# Patient Record
(demographics unavailable — no encounter records)

---

## 2024-10-08 NOTE — HISTORY OF PRESENT ILLNESS
[Disease: _____________________] : Disease: [unfilled] [de-identified] : This is a 51 yo male with hx of pancreatic head mass which on biopsy is consistent with adenocarcinoma who presents for evaluation and management. 06/02/2024, CT-Scan of Abdomen/Pelvis w IV Contrast: 2 cm circumscribed hypodense mass within the head of the pancreas suspicious for pancreatic carcinoma resulting in moderate dilatation of the main pancreatic duct, biliary system as well as the gallbladder. Mild peripancreatic lymphadenopathy possibly metastatic in nature. Finding suspicious for mild segmental colitis involving descending and  sigmoid colon.   06/02/2024, CT-Scan of Chest: A 10 X 8 X 6 mm mildly spiculated nodule in the right lower lobe which is suspicious. Further evaluation with PET/CT is recommended. Alternatively, short-term follow-up with chest CT in 3 months may be considered. No other metastatic lesion in the chest. Partially visualized pancreatic head mass with dilatation of the main pancreatic duct.   06/04/2024, ERCP: The major papilla was native. The major papilla had a normal appearance. The common bile duct was deeply cannulated using a traction sphincterotome with guidewire. No bleeding was observed. Localized, extrinsic and severe stricture was visualized in the distal common bile duct. The stricture was traversable by wire. Performed brushing with cytology brush in the distal common bile duct. One 10 mm x 4mm fully covered metal stent was placed in the distal common bile duct. The pathology report from that was as follows: Distal common bile duct brushing for cytology: Positive for Adenocarcinoma.   06/10/2024, MRI of Abdomen: In the head of pancreas, there is a 2.5 X 2.1 cm heterogenous mass suggestive of malignancy. Pancreatic duct is dilated up to 10 mm. Status post CBD stent placement. There is significant improvemnt in the intrahepatic biliary dilatation. Right lower lobe  lung nodules is incompletely imaged.  [de-identified] : Since the last visit the patient continues on FOLFIRINOX and tolerates the treatment well.  He does complain of some low-grade midsternal pain but otherwise his other bone pain has been improved.

## 2024-10-08 NOTE — ASSESSMENT
[Curative] : Goals of care discussed with patient: Curative [FreeTextEntry1] : The patient will continue on protocol treatment for his metastatic gastric carcinoma with bone metastases.  He will continue to be monitored for side effects and toxicity.  He will undergo repeat blood testing and scans to assess for response or progression of disease.  He will continue nutrition support.  The patient will also be a candidate for bone directed treatment pending dental evaluation. [Palliative] : Goals of care discussed with patient: Palliative [Palliative Care Plan] : not applicable at this time

## 2024-10-08 NOTE — HISTORY OF PRESENT ILLNESS
[Disease: _____________________] : Disease: [unfilled] [de-identified] : This is a 51 yo male with hx of pancreatic head mass which on biopsy is consistent with adenocarcinoma who presents for evaluation and management. 06/02/2024, CT-Scan of Abdomen/Pelvis w IV Contrast: 2 cm circumscribed hypodense mass within the head of the pancreas suspicious for pancreatic carcinoma resulting in moderate dilatation of the main pancreatic duct, biliary system as well as the gallbladder. Mild peripancreatic lymphadenopathy possibly metastatic in nature. Finding suspicious for mild segmental colitis involving descending and  sigmoid colon.   06/02/2024, CT-Scan of Chest: A 10 X 8 X 6 mm mildly spiculated nodule in the right lower lobe which is suspicious. Further evaluation with PET/CT is recommended. Alternatively, short-term follow-up with chest CT in 3 months may be considered. No other metastatic lesion in the chest. Partially visualized pancreatic head mass with dilatation of the main pancreatic duct.   06/04/2024, ERCP: The major papilla was native. The major papilla had a normal appearance. The common bile duct was deeply cannulated using a traction sphincterotome with guidewire. No bleeding was observed. Localized, extrinsic and severe stricture was visualized in the distal common bile duct. The stricture was traversable by wire. Performed brushing with cytology brush in the distal common bile duct. One 10 mm x 4mm fully covered metal stent was placed in the distal common bile duct. The pathology report from that was as follows: Distal common bile duct brushing for cytology: Positive for Adenocarcinoma.   06/10/2024, MRI of Abdomen: In the head of pancreas, there is a 2.5 X 2.1 cm heterogenous mass suggestive of malignancy. Pancreatic duct is dilated up to 10 mm. Status post CBD stent placement. There is significant improvemnt in the intrahepatic biliary dilatation. Right lower lobe  lung nodules is incompletely imaged.  [de-identified] : Since the last visit the patient continues on FOLFIRINOX and tolerates the treatment well.  He does complain of some low-grade midsternal pain but otherwise his other bone pain has been improved.

## 2024-10-08 NOTE — HISTORY OF PRESENT ILLNESS
[Disease: _____________________] : Disease: [unfilled] [de-identified] : This is a 51 yo male with hx of pancreatic head mass which on biopsy is consistent with adenocarcinoma who presents for evaluation and management. 06/02/2024, CT-Scan of Abdomen/Pelvis w IV Contrast: 2 cm circumscribed hypodense mass within the head of the pancreas suspicious for pancreatic carcinoma resulting in moderate dilatation of the main pancreatic duct, biliary system as well as the gallbladder. Mild peripancreatic lymphadenopathy possibly metastatic in nature. Finding suspicious for mild segmental colitis involving descending and  sigmoid colon.   06/02/2024, CT-Scan of Chest: A 10 X 8 X 6 mm mildly spiculated nodule in the right lower lobe which is suspicious. Further evaluation with PET/CT is recommended. Alternatively, short-term follow-up with chest CT in 3 months may be considered. No other metastatic lesion in the chest. Partially visualized pancreatic head mass with dilatation of the main pancreatic duct.   06/04/2024, ERCP: The major papilla was native. The major papilla had a normal appearance. The common bile duct was deeply cannulated using a traction sphincterotome with guidewire. No bleeding was observed. Localized, extrinsic and severe stricture was visualized in the distal common bile duct. The stricture was traversable by wire. Performed brushing with cytology brush in the distal common bile duct. One 10 mm x 4mm fully covered metal stent was placed in the distal common bile duct. The pathology report from that was as follows: Distal common bile duct brushing for cytology: Positive for Adenocarcinoma.   06/10/2024, MRI of Abdomen: In the head of pancreas, there is a 2.5 X 2.1 cm heterogenous mass suggestive of malignancy. Pancreatic duct is dilated up to 10 mm. Status post CBD stent placement. There is significant improvemnt in the intrahepatic biliary dilatation. Right lower lobe  lung nodules is incompletely imaged.  [de-identified] : Since the last visit the patient continues on FOLFIRINOX and tolerates the treatment well.  He does complain of some low-grade midsternal pain but otherwise his other bone pain has been improved.

## 2024-10-22 NOTE — ASSESSMENT
[FreeTextEntry1] : S/p C7 FOLFIRNOX. He is stable to continue cycle 8 today.  He will continue to be monitored for side effects and toxicity.  He will undergo repeat blood testing and scans to assess for response or progression of disease. After cycle 8, we will get a repeat CT-Scan of abdomen/pelvis/chest to assess for progression vs regression of disease. His case will be re-evaluated by [facility] to see, if her would be ready for surgical resection. Dr. Mar will contact patient with next steps post review.

## 2024-10-22 NOTE — HISTORY OF PRESENT ILLNESS
[de-identified] : This is a 51 yo male with hx of pancreatic head mass which on biopsy is consistent with adenocarcinoma who presents for evaluation and management. 06/02/2024, CT-Scan of Abdomen/Pelvis w IV Contrast: 2 cm circumscribed hypodense mass within the head of the pancreas suspicious for pancreatic carcinoma resulting in moderate dilatation of the main pancreatic duct, biliary system as well as the gallbladder. Mild peripancreatic lymphadenopathy possibly metastatic in nature. Finding suspicious for mild segmental colitis involving descending and  sigmoid colon.   06/02/2024, CT-Scan of Chest: A 10 X 8 X 6 mm mildly spiculated nodule in the right lower lobe which is suspicious. Further evaluation with PET/CT is recommended. Alternatively, short-term follow-up with chest CT in 3 months may be considered. No other metastatic lesion in the chest. Partially visualized pancreatic head mass with dilatation of the main pancreatic duct.   06/04/2024, ERCP: The major papilla was native. The major papilla had a normal appearance. The common bile duct was deeply cannulated using a traction sphincterotome with guidewire. No bleeding was observed. Localized, extrinsic and severe stricture was visualized in the distal common bile duct. The stricture was traversable by wire. Performed brushing with cytology brush in the distal common bile duct. One 10 mm x 4mm fully covered metal stent was placed in the distal common bile duct. The pathology report from that was as follows: Distal common bile duct brushing for cytology: Positive for Adenocarcinoma.   06/10/2024, MRI of Abdomen: In the head of pancreas, there is a 2.5 X 2.1 cm heterogenous mass suggestive of malignancy. Pancreatic duct is dilated up to 10 mm. Status post CBD stent placement. There is significant improvemnt in the intrahepatic biliary dilatation. Right lower lobe  lung nodules is incompletely imaged.  [de-identified] : S/p C7 FOLFIRINOX. Patient feels fine. He denies any abdominal pain, peripheral neuropathy, mouth sores or constipation. He uses Creon to control the loose stool with + improvement. The patient has gone back to work without difficulty.

## 2024-10-30 NOTE — HISTORY OF PRESENT ILLNESS
[Jaundice] : jaundice [Acholic stools] : acholic stools [Pruritus] : pruritus [ERCP] : ERCP [Endostent] : endostent [EUS] : EUS [Biopsy] : biopsy performed [Not staged outside] : not staged outside [Pancreatic ductal adenocarcinoma] : Pancreatic ductal adenocarcinoma [Borderline] : borderline [Less than 180 (abutment)] : less than 180 (abutment) [head] : head [Nutrition] : Nutrition [Social Work] : Social Work [Fully active, able to carry on all pre-disease performance without restriction] : Status 0 - Fully active, able to carry on all pre-disease performance without restriction [Tolerated well] : tolerated well [Chemotherapy] : chemotherapy [Before Surgery] : before surgery [History of Neoadjuvant Therapy] : History of Neoadjuvant Therapy: Yes [5-FU based] : Type of 1st Neoadjuvant chemotherapy: 5-FU based [de-identified] : This is a non-billable note*   53 year old male    Labs:   7/16/24   Ca 19-9: 308   CEA: 1.2  Tbili: 0.5  8/27/24:  Ca 19-9 : 334  CEA: 4.9  Tbili: 0.2  10/22/24: Ca 19-9:  175  CEA: 6.2  Tbili:  0.3  [TextBox_4] : 06/02/2024 [TextBox_41] : adenocarcinoma [TextBox_43] : 07/15/2024 [TextBox_56] : 25 [FreeTextEntry6] : Continue current chemo.  Review in 2 months [Stable disease] : stable disease [Surgical resection] : surgical resection [Curative (aimed at resection)] : curative (aimed at resection) [TextBox_5] : 10/29/2024 [TextBox_38] : 175 [FreeTextEntry3] : 6.2 [FreeTextEntry4] : 0.3 [TextBox_40] : 10/25/2024 [TextBox_74] : Tumor slightly smaller.  Vessels: SMV < 180.  Replaced R HA from the SMA with >180 contact w/ primary tumor Contained abscess in the liver      [TextBox_12] : FOLFIRINOX [TextBox_16] : 07/18/2024 [TextBox_18] : 10/22/2024 [TextBox_20] : 8

## 2024-10-30 NOTE — HISTORY OF PRESENT ILLNESS
[Jaundice] : jaundice [Acholic stools] : acholic stools [Pruritus] : pruritus [ERCP] : ERCP [Endostent] : endostent [EUS] : EUS [Biopsy] : biopsy performed [Not staged outside] : not staged outside [Pancreatic ductal adenocarcinoma] : Pancreatic ductal adenocarcinoma [Borderline] : borderline [Less than 180 (abutment)] : less than 180 (abutment) [head] : head [Nutrition] : Nutrition [Social Work] : Social Work [Fully active, able to carry on all pre-disease performance without restriction] : Status 0 - Fully active, able to carry on all pre-disease performance without restriction [Tolerated well] : tolerated well [Chemotherapy] : chemotherapy [Before Surgery] : before surgery [History of Neoadjuvant Therapy] : History of Neoadjuvant Therapy: Yes [5-FU based] : Type of 1st Neoadjuvant chemotherapy: 5-FU based [de-identified] : This is a non-billable note*   53 year old male    Labs:   7/16/24   Ca 19-9: 308   CEA: 1.2  Tbili: 0.5  8/27/24:  Ca 19-9 : 334  CEA: 4.9  Tbili: 0.2  10/22/24: Ca 19-9:  175  CEA: 6.2  Tbili:  0.3  [TextBox_4] : 06/02/2024 [TextBox_41] : adenocarcinoma [TextBox_43] : 07/15/2024 [TextBox_56] : 25 [FreeTextEntry6] : Continue current chemo.  Review in 2 months [Stable disease] : stable disease [Surgical resection] : surgical resection [Curative (aimed at resection)] : curative (aimed at resection) [TextBox_5] : 10/29/2024 [TextBox_38] : 175 [FreeTextEntry3] : 6.2 [FreeTextEntry4] : 0.3 [TextBox_40] : 10/25/2024 [TextBox_74] : Tumor slightly smaller.  Vessels: SMV < 180.  Replaced R HA from the SMA with >180 contact w/ primary tumor Contained abscess in the liver      [TextBox_12] : FOLFIRINOX [TextBox_16] : 07/18/2024 [TextBox_18] : 10/22/2024 [TextBox_20] : 8

## 2024-11-05 NOTE — HISTORY OF PRESENT ILLNESS
[Disease: _____________________] : Disease: [unfilled] [de-identified] : This is a 53 yo male with hx of pancreatic head mass which on biopsy is consistent with adenocarcinoma who presents for evaluation and management. 06/02/2024, CT-Scan of Abdomen/Pelvis w IV Contrast: 2 cm circumscribed hypodense mass within the head of the pancreas suspicious for pancreatic carcinoma resulting in moderate dilatation of the main pancreatic duct, biliary system as well as the gallbladder. Mild peripancreatic lymphadenopathy possibly metastatic in nature. Finding suspicious for mild segmental colitis involving descending and  sigmoid colon.   06/02/2024, CT-Scan of Chest: A 10 X 8 X 6 mm mildly spiculated nodule in the right lower lobe which is suspicious. Further evaluation with PET/CT is recommended. Alternatively, short-term follow-up with chest CT in 3 months may be considered. No other metastatic lesion in the chest. Partially visualized pancreatic head mass with dilatation of the main pancreatic duct.   06/04/2024, ERCP: The major papilla was native. The major papilla had a normal appearance. The common bile duct was deeply cannulated using a traction sphincterotome with guidewire. No bleeding was observed. Localized, extrinsic and severe stricture was visualized in the distal common bile duct. The stricture was traversable by wire. Performed brushing with cytology brush in the distal common bile duct. One 10 mm x 4mm fully covered metal stent was placed in the distal common bile duct. The pathology report from that was as follows: Distal common bile duct brushing for cytology: Positive for Adenocarcinoma.   06/10/2024, MRI of Abdomen: In the head of pancreas, there is a 2.5 X 2.1 cm heterogenous mass suggestive of malignancy. Pancreatic duct is dilated up to 10 mm. Status post CBD stent placement. There is significant improvemnt in the intrahepatic biliary dilatation. Right lower lobe  lung nodules is incompletely imaged.  [de-identified] : S/p C8 FOLFIRINOX. The patient was seen in the treatment room. Patient feels fine. He denies any abdominal pain, peripheral neuropathy, mouth sores or constipation. He has been scheduled for surgery in early December 2024.

## 2024-11-06 NOTE — ASSESSMENT
[FreeTextEntry1] : Mr. ROSALINO TREJO is a 52-year-old male [facility] patient presenting for follow-up to discuss resection for PDAC diagnosed 6/2024 initially staged as borderline s/p neoadjuvant FFX.   S/p 4 months (8 cycles) of neoadjuvant FFX 7/18-10/22/2024 with med/onc Dr. Mar.  Restaging CAP CT 10/25/2024 demonstrated a hypodense mass in the pancreatic head/uncinate process has decreased in size measuring 2.6 x 2 cm (3/85), previously 3.2 x 2.4 cm when measured in similar fashion. There is less than 180 degrees involvement of the main portal vein. The SMA and celiac axis are not involved. The remainder of the pancreas is atrophic with a markedly dilated pancreatic duct measuring up to 1.2 cm. There was a new fluid density 4.7 x 3.3 cm cystic lesion in the right lobe of the liver apparently arising from the gallbladder dome which also demonstrates wall thickening. This is suspicious for focal gallbladder necrosis and abscess formation.  His case was re-reviewed at [facility] 10/29/24. Consensus was the tumor was slightly smaller. Vessels: SMV < 180. Replaced R HA from the SMA with >180 contact w/ primary tumor. Recommendation was for surgical resection.   6/12/24 - CEA: 2.1, Ca 19-9: 445 at diagnosis, now 10/22/24 - CEA: 6.2, Ca 19-9: 175.  Patient presents for follow-up and to discuss resection. There is no evidence of metastases on his imaging studies which is good news. Discussed the vascular involvement of his tumor and replaced right hepatic artery. I drew a diagram to better assist patient in understanding the anatomy and pathology of his condition. Artery reconstruction will be determined intraoperatively, though there is low chance of this.   Due to the location and size he will require a pancreaticoduodenectomy (Whipple) procedure for removal. I drew a diagram for the patient and family to better understand the operation. The procedure and associated risks, benefits and possible complications were discussed and all questions were answered. The procedure involves removal of the gallbladder, head of the pancreas, bile duct, duodenum and sometimes a small portion of the stomach and will last 3-4 hours with a hospital stay of about one week. We discussed that the most common complaint after this procedure is fatigue followed by poor appetite with associated weight loss. It will take approximately 8-12 weeks to feel back to normal after the operation. The risks, benefits, and details of the operation were discussed. These include bleeding, infection, damage to surrounding structures, chyle leakage, delayed gastric emptying, cardiopulmonary complications, and the risks of anesthesia. Mortality is quoted at <1.4%. I discussed that adjuvant treatment including additional chemotherapy/ radiation therapy will be determined by final pathology. The patient expressed understanding and the desire to proceed. Our office will contact the patient to schedule 1 month out from his last dose of chemo 10/22. He will require standard PST and PCP clearance.  Today, I personally spent 45 minutes in total time including reviewing imaging and studies, discussing complex treatment regimens, direct face to face time with the patient, patient education and counseling.

## 2024-11-06 NOTE — HISTORY OF PRESENT ILLNESS
[de-identified] : Mr. ROSALINO TREJO is a 52-year-old male [facility] patient presenting for follow-up to discuss resection for PDAC initially staged as borderline s/p neoadjuvant FFX. He has a prior history of ETOH abuse, sober for >15 years; FMHx of colon cancer (father and paternal uncle), prostate cancer (father), PNET (father)  He initially presented to [facility] on 6/2/2024 after noticing aliza colored (acholic stools) and pruritus. Found to have jaundice. Underwent CT A/P IC on 6/2/2024 showing a 2 cm circumscribed hypodense mass within the head of the pancreas suspicious for pancreatic carcinoma result in in moderate dilatation of the main pancreatic duct, biliary system as well as the gall bladder. Mild peripancreatic lymphadenopathy, possibly metastatic in nature. Also incidental findings suspicious for mild segmental colitis involving descending and sigmoid colon. CT chest 6/5/24: A 10 x 6 mm mildly spiculated nodule in the right lower lobe which is suspicious.  Underwent ERCP with stent placement on 06/03/2024 (no EUS). Pathology of bile duct brushings are consistent with adenocarcinoma.  Further evaluation with MRI on 06/10/2024 demonstrated a 2.5 x 2.1 cm heterogeneous mass in the head of the pancreas, suggestive of malignancy. Main portal vein was present. The mass abutted the superior mesenteric vein but did not totally encase it; superior mesenteric artery was patent. Pancreatic duct was dilated up to 10 mm. S/p CBD stent placement; there was significant improvement in intrahepatic biliary ductal dilatation. Gallbladder distended with tiny polyps noted.  S/p 4 months (8 cycles) of neoadjuvant FFX 7/18-10/22/2024 with med/onc Dr. Mar.  Restaging CAP CT 10/25/2024 demonstrated a hypodense mass in the pancreatic head/uncinate process has decreased in size measuring 2.6 x 2 cm (3/85), previously 3.2 x 2.4 cm when measured in similar fashion. There is less than 180 degrees involvement of the main portal vein. The SMA and celiac axis are not involved. The remainder of the pancreas is atrophic with a markedly dilated pancreatic duct measuring up to 1.2 cm. There was a new fluid density 4.7 x 3.3 cm cystic lesion in the right lobe of the liver apparently arising from the gallbladder dome which also demonstrates wall thickening. This is suspicious for focal gallbladder necrosis and abscess formation.  His case was re-reviewed at [facility] 10/29/24. Consensus was the tumor was slightly smaller. Vessels: SMV < 180. Replaced R HA from the SMA with >180 contact w/ primary tumor. Recommendation was for surgical resection.   Labs: 6/5/24 (post ERCP/stent): Ca 19-9: 433 CEA: --; AST: 135, ALT: 296, ALP: 304, Tbili: 6.5. 6/12/24 - CEA: 2.1, Ca 19-9: 445 7/16/24 - CEA: 1.2, Ca 19-9: 308 8/13/24 - CEA: 2.9, Ca 19-9: 283 8/27/24 - CEA: 4.9, Ca 19-9: 334 9/24/24 - CEA: 4.5, Ca 19-9: 234 10/8/24 - CEA: 4.9, Ca 19-9: 167 10/22/24 - CEA: 6.2, Ca 19-9: 175  Patient presents for follow-up and to discuss resection. Tolerating chemo well barring some difficulty in the past week. He has returned to work part-time.

## 2024-11-06 NOTE — HISTORY OF PRESENT ILLNESS
[de-identified] : Mr. ROSALINO TREJO is a 52-year-old male [facility] patient presenting for follow-up to discuss resection for PDAC initially staged as borderline s/p neoadjuvant FFX. He has a prior history of ETOH abuse, sober for >15 years; FMHx of colon cancer (father and paternal uncle), prostate cancer (father), PNET (father)  He initially presented to [facility] on 6/2/2024 after noticing aliza colored (acholic stools) and pruritus. Found to have jaundice. Underwent CT A/P IC on 6/2/2024 showing a 2 cm circumscribed hypodense mass within the head of the pancreas suspicious for pancreatic carcinoma result in in moderate dilatation of the main pancreatic duct, biliary system as well as the gall bladder. Mild peripancreatic lymphadenopathy, possibly metastatic in nature. Also incidental findings suspicious for mild segmental colitis involving descending and sigmoid colon. CT chest 6/5/24: A 10 x 6 mm mildly spiculated nodule in the right lower lobe which is suspicious.  Underwent ERCP with stent placement on 06/03/2024 (no EUS). Pathology of bile duct brushings are consistent with adenocarcinoma.  Further evaluation with MRI on 06/10/2024 demonstrated a 2.5 x 2.1 cm heterogeneous mass in the head of the pancreas, suggestive of malignancy. Main portal vein was present. The mass abutted the superior mesenteric vein but did not totally encase it; superior mesenteric artery was patent. Pancreatic duct was dilated up to 10 mm. S/p CBD stent placement; there was significant improvement in intrahepatic biliary ductal dilatation. Gallbladder distended with tiny polyps noted.  S/p 4 months (8 cycles) of neoadjuvant FFX 7/18-10/22/2024 with med/onc Dr. Mar.  Restaging CAP CT 10/25/2024 demonstrated a hypodense mass in the pancreatic head/uncinate process has decreased in size measuring 2.6 x 2 cm (3/85), previously 3.2 x 2.4 cm when measured in similar fashion. There is less than 180 degrees involvement of the main portal vein. The SMA and celiac axis are not involved. The remainder of the pancreas is atrophic with a markedly dilated pancreatic duct measuring up to 1.2 cm. There was a new fluid density 4.7 x 3.3 cm cystic lesion in the right lobe of the liver apparently arising from the gallbladder dome which also demonstrates wall thickening. This is suspicious for focal gallbladder necrosis and abscess formation.  His case was re-reviewed at [facility] 10/29/24. Consensus was the tumor was slightly smaller. Vessels: SMV < 180. Replaced R HA from the SMA with >180 contact w/ primary tumor. Recommendation was for surgical resection.   Labs: 6/5/24 (post ERCP/stent): Ca 19-9: 433 CEA: --; AST: 135, ALT: 296, ALP: 304, Tbili: 6.5. 6/12/24 - CEA: 2.1, Ca 19-9: 445 7/16/24 - CEA: 1.2, Ca 19-9: 308 8/13/24 - CEA: 2.9, Ca 19-9: 283 8/27/24 - CEA: 4.9, Ca 19-9: 334 9/24/24 - CEA: 4.5, Ca 19-9: 234 10/8/24 - CEA: 4.9, Ca 19-9: 167 10/22/24 - CEA: 6.2, Ca 19-9: 175  Patient presents for follow-up and to discuss resection. Tolerating chemo well barring some difficulty in the past week. He has returned to work part-time.

## 2024-11-06 NOTE — PHYSICAL EXAM
[FreeTextEntry1] : General: No acute distress. Well nourished and well kept. Head: AT/NC Eyes: PERRL. EOMI. Pulmonary: No respiratory distress. Abdomen: Soft. NT/ND. No rebound, no guarding, no rigidity. No peritoneal signs. No masses. Neurological: A&O x 4. Psychiatric: Normal affect and mood.

## 2024-11-06 NOTE — END OF VISIT
[FreeTextEntry3] : By signing my name below, I, Memeganyoan Burnett, attest that this documentation has been prepared under the direction and in the presence of Kiran Silverman MD in the following sections HISTORY OF PRESENT ILLNESS; PAST MEDICAL/FAMILY/SOCIAL HISTORY; REVIEW OF SYSTEMS; PHYSICAL EXAM; ASSESSMENT/PLAN.

## 2024-11-06 NOTE — ASSESSMENT
[FreeTextEntry1] : Mr. ROSALINO TREJO is a 52-year-old male [facility] patient presenting for follow-up to discuss resection for PDAC diagnosed 6/2024 initially staged as borderline s/p neoadjuvant FFX.   S/p 4 months (8 cycles) of neoadjuvant FFX 7/18-10/22/2024 with med/onc Dr. Mar.  Restaging CAP CT 10/25/2024 demonstrated a hypodense mass in the pancreatic head/uncinate process has decreased in size measuring 2.6 x 2 cm (3/85), previously 3.2 x 2.4 cm when measured in similar fashion. There is less than 180 degrees involvement of the main portal vein. The SMA and celiac axis are not involved. The remainder of the pancreas is atrophic with a markedly dilated pancreatic duct measuring up to 1.2 cm. There was a new fluid density 4.7 x 3.3 cm cystic lesion in the right lobe of the liver apparently arising from the gallbladder dome which also demonstrates wall thickening. This is suspicious for focal gallbladder necrosis and abscess formation.  His case was re-reviewed at [facility] 10/29/24. Consensus was the tumor was slightly smaller. Vessels: SMV < 180. Replaced R HA from the SMA with >180 contact w/ primary tumor. Recommendation was for surgical resection.   6/12/24 - CEA: 2.1, Ca 19-9: 445 at diagnosis, now 10/22/24 - CEA: 6.2, Ca 19-9: 175.  Patient presents for follow-up and to discuss resection. There is no evidence of metastases on his imaging studies which is good news. Discussed the vascular involvement of his tumor and replaced right hepatic artery. I drew a diagram to better assist patient in understanding the anatomy and pathology of his condition. Artery reconstruction will be determined intraoperatively, though there is low chance of this.   Due to the location and size he will require a pancreaticoduodenectomy (Whipple) procedure for removal. I drew a diagram for the patient and family to better understand the operation. The procedure and associated risks, benefits and possible complications were discussed and all questions were answered. The procedure involves removal of the gallbladder, head of the pancreas, bile duct, duodenum and sometimes a small portion of the stomach and will last 3-4 hours with a hospital stay of about one week. We discussed that the most common complaint after this procedure is fatigue followed by poor appetite with associated weight loss. It will take approximately 8-12 weeks to feel back to normal after the operation. The risks, benefits, and details of the operation were discussed. These include bleeding, infection, damage to surrounding structures, chyle leakage, delayed gastric emptying, cardiopulmonary complications, and the risks of anesthesia. Mortality is quoted at <1.4%. I discussed that adjuvant treatment including additional chemotherapy/ radiation therapy will be determined by final pathology. The patient expressed understanding and the desire to proceed. Our office will contact the patient to schedule 1 month out from his last dose of chemo 10/22. He will require standard PST and PCP clearance.  Today, I personally spent 45 minutes in total time including reviewing imaging and studies, discussing complex treatment regimens, direct face to face time with the patient, patient education and counseling.

## 2025-01-16 NOTE — ASSESSMENT
[FreeTextEntry1] : Mr. 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  S/p 4 months (8 cycles) of neoadjuvant FFX 7/18-10/22/2024 with med/onc Dr. Mar.  Post-op patient sustained 2 vasovagal episodes (also had "fainting spells" with chemo); cardiac and neuro workup negative. Decreased Lantus (follows with endo) and stopped Creon at discharge. Was having normal BMs.  Patient presents for postoperative evaluation. Needs to f/u with med/onc for different chemo regimen and rad/onc. Patient was educated on proper Creon use, instructed to take with meals. Advised him to stop taking stool softeners which will likely help with bowel movements. Advised him to eat small, frequent meals, and supplement nutrition with protein shakes. Incision is well healed on examination.  We discussed the final pathology of the operation which yielded 7/20 positive lymph nodes. The chemotherapy regimen he was on (FFX) did not seem to have a dramatic effect on the tumor based on pathology. He is recovering well enough that I would like to start thinking about next steps including switching chemo regimen and consider RT, likely adjuvant chemo to start with first. I discussed his case with his med/onc Dr. Mar to coordinate. Patient lives [facility] and states he is willing to stay in  with his father to get additional chemo treatment; he wants to stay with the same doctors. He was encouraged to contact the office with any acute changes to his health. Discussed taking Tylenol and ibuprofen and continue with heating packs for muscular back discomfort.  Discussed proper way to take Creon before meals. Discontinue stool softeners as he was having some intermittent loose stools. We will plan to see him back in 2-3 months. Will contact the office in the interim should his condition change.

## 2025-01-16 NOTE — HISTORY OF PRESENT ILLNESS
[FreeTextEntry1] : Mr. 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 s/p neoadjuvant FFX. He has a prior history of ETOH abuse, sober for >15 years; FMHx of colon cancer (father and paternal uncle), prostate cancer (father), PNET (father)  He initially presented to [facility] on 6/2/2024 after noticing aliza colored (acholic stools) and pruritus. Found to have jaundice. Underwent CT A/P IC on 6/2/2024 showing a 2 cm circumscribed hypodense mass within the head of the pancreas suspicious for pancreatic carcinoma result in in moderate dilatation of the main pancreatic duct, biliary system as well as the gall bladder. Mild peripancreatic lymphadenopathy, possibly metastatic in nature. Also incidental findings suspicious for mild segmental colitis involving descending and sigmoid colon. CT chest 6/5/24: A 10 x 6 mm mildly spiculated nodule in the right lower lobe which is suspicious.  Underwent ERCP with stent placement on 06/03/2024 (no EUS). Pathology of bile duct brushings are consistent with adenocarcinoma.  Further evaluation with MRI on 06/10/2024 demonstrated a 2.5 x 2.1 cm heterogeneous mass in the head of the pancreas, suggestive of malignancy. Main portal vein was present. The mass abutted the superior mesenteric vein but did not totally encase it; superior mesenteric artery was patent. Pancreatic duct was dilated up to 10 mm. S/p CBD stent placement; there was significant improvement in intrahepatic biliary ductal dilatation. Gallbladder distended with tiny polyps noted.  S/p 4 months (8 cycles) of neoadjuvant FFX 7/18-10/22/2024 with med/onc Dr. Mar.  Restaging CAP CT 10/25/2024 demonstrated a hypodense mass in the pancreatic head/uncinate process has decreased in size measuring 2.6 x 2 cm (3/85), previously 3.2 x 2.4 cm when measured in similar fashion. There is less than 180 degrees involvement of the main portal vein. The SMA and celiac axis are not involved. The remainder of the pancreas is atrophic with a markedly dilated pancreatic duct measuring up to 1.2 cm. There was a new fluid density 4.7 x 3.3 cm cystic lesion in the right lobe of the liver apparently arising from the gallbladder dome which also demonstrates wall thickening. This is suspicious for focal gallbladder necrosis and abscess formation.  His case was re-reviewed at [facility] 10/29/24. Consensus was the tumor was slightly smaller. Vessels: SMV < 180. Replaced R HA from the SMA with >180 contact w/ primary tumor. Recommendation was for surgical resection.   Patient s/p Whipple with right hemicolectomy on 12/04/2024. 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 Post-op patient sustained 2 vasovagal episodes (also had "fainting spells" with chemo); cardiac and neuro workup negative. Decreased Lantus (follows with endo) and stopped Creon at discharge. Was having normal BMs.   Labs: 6/5/24 (post ERCP/stent): Ca 19-9: 433 CEA: --; AST: 135, ALT: 296, ALP: 304, Tbili: 6.5. 6/12/24 - CEA: 2.1, Ca 19-9: 445 7/16/24 - CEA: 1.2, Ca 19-9: 308 8/13/24 - CEA: 2.9, Ca 19-9: 283 8/27/24 - CEA: 4.9, Ca 19-9: 334 9/24/24 - CEA: 4.5, Ca 19-9: 234 10/8/24 - CEA: 4.9, Ca 19-9: 167 10/22/24 - CEA: 6.2, Ca 19-9: 175  Patient presents for postoperative evaluation. Needs to f/u with med/onc for different chemo regimen and rad/onc. Reports vomiting after eating Chinese food 2 days ago, not consistently taking Creon and was taking after meals. Having multiple bowel movements a day but was also taking stool softeners which he stopped last night. Complains of intermittent back pain, relieved by heating packs. Has not been taking Tylenol and ibuprofen consistently. Stopped oxycodone a week ago.  Blood sugars in 130-140s. He has been walking around the house, trying to stay active.

## 2025-01-16 NOTE — ASSESSMENT
[FreeTextEntry1] : Mr. 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  S/p 4 months (8 cycles) of neoadjuvant FFX 7/18-10/22/2024 with med/onc Dr. Mar.  Post-op patient sustained 2 vasovagal episodes (also had "fainting spells" with chemo); cardiac and neuro workup negative. Decreased Lantus (follows with endo) and stopped Creon at discharge. Was having normal BMs.  Patient presents for postoperative evaluation. Needs to f/u with med/onc for different chemo regimen and rad/onc. Patient was educated on proper Creon use, instructed to take with meals. Advised him to stop taking stool softeners which will likely help with bowel movements. Advised him to eat small, frequent meals, and supplement nutrition with protein shakes. Incision is well healed on examination.  We discussed the final pathology of the operation which yielded 7/20 positive lymph nodes. The chemotherapy regimen he was on (FFX) did not seem to have a dramatic effect on the tumor based on pathology. He is recovering well enough that I would like to start thinking about next steps including switching chemo regimen and consider RT, likely adjuvant chemo to start with first. I discussed his case with his med/onc Dr. Mar to coordinate. Patient lives [facility] and states he is willing to stay in  with his father to get additional chemo treatment; he wants to stay with the same doctors. He was encouraged to contact the office with any acute changes to his health. Discussed taking Tylenol and ibuprofen and continue with heating packs for muscular back discomfort.  Discussed proper way to take Creon before meals. Discontinue stool softeners as he was having some intermittent loose stools. We will plan to see him back in 2-3 months. Will contact the office in the interim should his condition change.

## 2025-01-16 NOTE — PHYSICAL EXAM
[TextEntry] : General: No acute distress. Well nourished and well kept. Head: AT/NC Eyes: PERRL. EOMI. Pulmonary: No respiratory distress. Abdomen: Soft. NT/ND. No rebound, no guarding, no rigidity. No peritoneal signs. No masses. Incision c/d/i - non-tender to palpation, no erythema or drainage at incision site.  Neurological: A&O x 4. Psychiatric: Normal affect and mood.

## 2025-01-16 NOTE — HISTORY OF PRESENT ILLNESS
[FreeTextEntry1] : Mr. 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 s/p neoadjuvant FFX. He has a prior history of ETOH abuse, sober for >15 years; FMHx of colon cancer (father and paternal uncle), prostate cancer (father), PNET (father)  He initially presented to [facility] on 6/2/2024 after noticing aliza colored (acholic stools) and pruritus. Found to have jaundice. Underwent CT A/P IC on 6/2/2024 showing a 2 cm circumscribed hypodense mass within the head of the pancreas suspicious for pancreatic carcinoma result in in moderate dilatation of the main pancreatic duct, biliary system as well as the gall bladder. Mild peripancreatic lymphadenopathy, possibly metastatic in nature. Also incidental findings suspicious for mild segmental colitis involving descending and sigmoid colon. CT chest 6/5/24: A 10 x 6 mm mildly spiculated nodule in the right lower lobe which is suspicious.  Underwent ERCP with stent placement on 06/03/2024 (no EUS). Pathology of bile duct brushings are consistent with adenocarcinoma.  Further evaluation with MRI on 06/10/2024 demonstrated a 2.5 x 2.1 cm heterogeneous mass in the head of the pancreas, suggestive of malignancy. Main portal vein was present. The mass abutted the superior mesenteric vein but did not totally encase it; superior mesenteric artery was patent. Pancreatic duct was dilated up to 10 mm. S/p CBD stent placement; there was significant improvement in intrahepatic biliary ductal dilatation. Gallbladder distended with tiny polyps noted.  S/p 4 months (8 cycles) of neoadjuvant FFX 7/18-10/22/2024 with med/onc Dr. Mar.  Restaging CAP CT 10/25/2024 demonstrated a hypodense mass in the pancreatic head/uncinate process has decreased in size measuring 2.6 x 2 cm (3/85), previously 3.2 x 2.4 cm when measured in similar fashion. There is less than 180 degrees involvement of the main portal vein. The SMA and celiac axis are not involved. The remainder of the pancreas is atrophic with a markedly dilated pancreatic duct measuring up to 1.2 cm. There was a new fluid density 4.7 x 3.3 cm cystic lesion in the right lobe of the liver apparently arising from the gallbladder dome which also demonstrates wall thickening. This is suspicious for focal gallbladder necrosis and abscess formation.  His case was re-reviewed at [facility] 10/29/24. Consensus was the tumor was slightly smaller. Vessels: SMV < 180. Replaced R HA from the SMA with >180 contact w/ primary tumor. Recommendation was for surgical resection.   Patient s/p Whipple with right hemicolectomy on 12/04/2024. 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 Post-op patient sustained 2 vasovagal episodes (also had "fainting spells" with chemo); cardiac and neuro workup negative. Decreased Lantus (follows with endo) and stopped Creon at discharge. Was having normal BMs.   Labs: 6/5/24 (post ERCP/stent): Ca 19-9: 433 CEA: --; AST: 135, ALT: 296, ALP: 304, Tbili: 6.5. 6/12/24 - CEA: 2.1, Ca 19-9: 445 7/16/24 - CEA: 1.2, Ca 19-9: 308 8/13/24 - CEA: 2.9, Ca 19-9: 283 8/27/24 - CEA: 4.9, Ca 19-9: 334 9/24/24 - CEA: 4.5, Ca 19-9: 234 10/8/24 - CEA: 4.9, Ca 19-9: 167 10/22/24 - CEA: 6.2, Ca 19-9: 175  Patient presents for postoperative evaluation. Needs to f/u with med/onc for different chemo regimen and rad/onc. Reports vomiting after eating Chinese food 2 days ago, not consistently taking Creon and was taking after meals. Having multiple bowel movements a day but was also taking stool softeners which he stopped last night. Complains of intermittent back pain, relieved by heating packs. Has not been taking Tylenol and ibuprofen consistently. Stopped oxycodone a week ago.  Blood sugars in 130-140s. He has been walking around the house, trying to stay active. well developed

## 2025-01-16 NOTE — REASON FOR VISIT
[de-identified] : Whipple w/ right hemicolectomy [de-identified] : 12/4/24 [de-identified] : 37 [de-identified] : 6

## 2025-01-16 NOTE — HISTORY OF PRESENT ILLNESS
[FreeTextEntry1] : Mr. 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 s/p neoadjuvant FFX. He has a prior history of ETOH abuse, sober for >15 years; FMHx of colon cancer (father and paternal uncle), prostate cancer (father), PNET (father)  He initially presented to [facility] on 6/2/2024 after noticing aliza colored (acholic stools) and pruritus. Found to have jaundice. Underwent CT A/P IC on 6/2/2024 showing a 2 cm circumscribed hypodense mass within the head of the pancreas suspicious for pancreatic carcinoma result in in moderate dilatation of the main pancreatic duct, biliary system as well as the gall bladder. Mild peripancreatic lymphadenopathy, possibly metastatic in nature. Also incidental findings suspicious for mild segmental colitis involving descending and sigmoid colon. CT chest 6/5/24: A 10 x 6 mm mildly spiculated nodule in the right lower lobe which is suspicious.  Underwent ERCP with stent placement on 06/03/2024 (no EUS). Pathology of bile duct brushings are consistent with adenocarcinoma.  Further evaluation with MRI on 06/10/2024 demonstrated a 2.5 x 2.1 cm heterogeneous mass in the head of the pancreas, suggestive of malignancy. Main portal vein was present. The mass abutted the superior mesenteric vein but did not totally encase it; superior mesenteric artery was patent. Pancreatic duct was dilated up to 10 mm. S/p CBD stent placement; there was significant improvement in intrahepatic biliary ductal dilatation. Gallbladder distended with tiny polyps noted.  S/p 4 months (8 cycles) of neoadjuvant FFX 7/18-10/22/2024 with med/onc Dr. Mar.  Restaging CAP CT 10/25/2024 demonstrated a hypodense mass in the pancreatic head/uncinate process has decreased in size measuring 2.6 x 2 cm (3/85), previously 3.2 x 2.4 cm when measured in similar fashion. There is less than 180 degrees involvement of the main portal vein. The SMA and celiac axis are not involved. The remainder of the pancreas is atrophic with a markedly dilated pancreatic duct measuring up to 1.2 cm. There was a new fluid density 4.7 x 3.3 cm cystic lesion in the right lobe of the liver apparently arising from the gallbladder dome which also demonstrates wall thickening. This is suspicious for focal gallbladder necrosis and abscess formation.  His case was re-reviewed at [facility] 10/29/24. Consensus was the tumor was slightly smaller. Vessels: SMV < 180. Replaced R HA from the SMA with >180 contact w/ primary tumor. Recommendation was for surgical resection.   Patient s/p Whipple with right hemicolectomy on 12/04/2024. 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 Post-op patient sustained 2 vasovagal episodes (also had "fainting spells" with chemo); cardiac and neuro workup negative. Decreased Lantus (follows with endo) and stopped Creon at discharge. Was having normal BMs.   Labs: 6/5/24 (post ERCP/stent): Ca 19-9: 433 CEA: --; AST: 135, ALT: 296, ALP: 304, Tbili: 6.5. 6/12/24 - CEA: 2.1, Ca 19-9: 445 7/16/24 - CEA: 1.2, Ca 19-9: 308 8/13/24 - CEA: 2.9, Ca 19-9: 283 8/27/24 - CEA: 4.9, Ca 19-9: 334 9/24/24 - CEA: 4.5, Ca 19-9: 234 10/8/24 - CEA: 4.9, Ca 19-9: 167 10/22/24 - CEA: 6.2, Ca 19-9: 175  Patient presents for postoperative evaluation. Needs to f/u with med/onc for different chemo regimen and rad/onc. Reports vomiting after eating Chinese food 2 days ago, not consistently taking Creon and was taking after meals. Having multiple bowel movements a day but was also taking stool softeners which he stopped last night. Complains of intermittent back pain, relieved by heating packs. Has not been taking Tylenol and ibuprofen consistently. Stopped oxycodone a week ago.  Blood sugars in 130-140s. He has been walking around the house, trying to stay active.

## 2025-01-16 NOTE — REASON FOR VISIT
[de-identified] : Whipple w/ right hemicolectomy [de-identified] : 12/4/24 [de-identified] : 37 [de-identified] : 6

## 2025-01-16 NOTE — ASSESSMENT
[FreeTextEntry1] : Mr. 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  S/p 4 months (8 cycles) of neoadjuvant FFX 7/18-10/22/2024 with med/onc Dr. Mar.  Post-op patient sustained 2 vasovagal episodes (also had "fainting spells" with chemo); cardiac and neuro workup negative. Decreased Lantus (follows with endo) and stopped Creon at discharge. Was having normal BMs.  Patient presents for postoperative evaluation. Needs to f/u with med/onc for different chemo regimen and rad/onc. Patient was educated on proper Creon use, instructed to take with meals. Advised him to stop taking stool softeners which will likely help with bowel movements. Advised him to eat small, frequent meals, and supplement nutrition with protein shakes. Incision is well healed on examination.  We discussed the final pathology of the operation which yielded 7/20 positive lymph nodes. The chemotherapy regimen he was on (FFX) did not seem to have a dramatic effect on the tumor based on pathology. He is recovering well enough that I would like to start thinking about next steps including switching chemo regimen and consider RT, likely adjuvant chemo to start with first. I discussed his case with his med/onc Dr. Mar to coordinate. Patient lives [facility] and states he is willing to stay in  with his father to get additional chemo treatment; he wants to stay with the same doctors. He was encouraged to contact the office with any acute changes to his health. Discussed taking Tylenol and ibuprofen and continue with heating packs for muscular back discomfort.  Discussed proper way to take Creon before meals. Discontinue stool softeners as he was having some intermittent loose stools. We will plan to see him back in 2-3 months. Will contact the office in the interim should his condition change.

## 2025-01-16 NOTE — ASSESSMENT
[FreeTextEntry1] : Mr. ROSALINO TREJO is a 53-year-old male 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  S/p 4 months (8 cycles) of neoadjuvant FFX 7/18-10/22/2024 with med/onc Dr. Mar.  Post-op patient sustained 2 vasovagal episodes (also had "fainting spells" with chemo); cardiac and neuro workup negative. Decreased Lantus (follows with endo) and stopped Creon at discharge. Was having normal BMs.  Patient presents for postoperative evaluation. Needs to f/u with med/onc for different chemo regimen and rad/onc. Patient was educated on proper Creon use, instructed to take with meals. Advised him to stop taking stool softeners which will likely help with bowel movements. Advised him to eat small, frequent meals, and supplement nutrition with protein shakes. Incision is well healed on examination.  We discussed the final pathology of the operation which yielded 7/20 positive lymph nodes. The chemotherapy regimen he was on (FFX) did not seem to have a dramatic effect on the tumor based on pathology. He is recovering well enough that I would like to start thinking about next steps including switching chemo regimen and consider RT, likely adjuvant chemo to start with first. I discussed his case with his med/onc Dr. Mar to coordinate. Patient lives [facility] and states he is willing to stay in  with his father to get additional chemo treatment; he wants to stay with the same doctors. He was encouraged to contact the office with any acute changes to his health. Discussed taking Tylenol and ibuprofen and continue with heating packs for muscular back discomfort.  Discussed proper way to take Creon before meals. Discontinue stool softeners as he was having some intermittent loose stools. We will plan to see him back in 2-3 months. Will contact the office in the interim should his condition change.

## 2025-01-16 NOTE — REASON FOR VISIT
[de-identified] : Whipple w/ right hemicolectomy [de-identified] : 12/4/24 [de-identified] : 37 [de-identified] : 6

## 2025-01-16 NOTE — REASON FOR VISIT
[de-identified] : Whipple w/ right hemicolectomy [de-identified] : 12/4/24 [de-identified] : 37 [de-identified] : 6

## 2025-01-16 NOTE — HISTORY OF PRESENT ILLNESS
[FreeTextEntry1] : Mr. 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 s/p neoadjuvant FFX. He has a prior history of ETOH abuse, sober for >15 years; FMHx of colon cancer (father and paternal uncle), prostate cancer (father), PNET (father)  He initially presented to [facility] on 6/2/2024 after noticing aliza colored (acholic stools) and pruritus. Found to have jaundice. Underwent CT A/P IC on 6/2/2024 showing a 2 cm circumscribed hypodense mass within the head of the pancreas suspicious for pancreatic carcinoma result in in moderate dilatation of the main pancreatic duct, biliary system as well as the gall bladder. Mild peripancreatic lymphadenopathy, possibly metastatic in nature. Also incidental findings suspicious for mild segmental colitis involving descending and sigmoid colon. CT chest 6/5/24: A 10 x 6 mm mildly spiculated nodule in the right lower lobe which is suspicious.  Underwent ERCP with stent placement on 06/03/2024 (no EUS). Pathology of bile duct brushings are consistent with adenocarcinoma.  Further evaluation with MRI on 06/10/2024 demonstrated a 2.5 x 2.1 cm heterogeneous mass in the head of the pancreas, suggestive of malignancy. Main portal vein was present. The mass abutted the superior mesenteric vein but did not totally encase it; superior mesenteric artery was patent. Pancreatic duct was dilated up to 10 mm. S/p CBD stent placement; there was significant improvement in intrahepatic biliary ductal dilatation. Gallbladder distended with tiny polyps noted.  S/p 4 months (8 cycles) of neoadjuvant FFX 7/18-10/22/2024 with med/onc Dr. Mar.  Restaging CAP CT 10/25/2024 demonstrated a hypodense mass in the pancreatic head/uncinate process has decreased in size measuring 2.6 x 2 cm (3/85), previously 3.2 x 2.4 cm when measured in similar fashion. There is less than 180 degrees involvement of the main portal vein. The SMA and celiac axis are not involved. The remainder of the pancreas is atrophic with a markedly dilated pancreatic duct measuring up to 1.2 cm. There was a new fluid density 4.7 x 3.3 cm cystic lesion in the right lobe of the liver apparently arising from the gallbladder dome which also demonstrates wall thickening. This is suspicious for focal gallbladder necrosis and abscess formation.  His case was re-reviewed at [facility] 10/29/24. Consensus was the tumor was slightly smaller. Vessels: SMV < 180. Replaced R HA from the SMA with >180 contact w/ primary tumor. Recommendation was for surgical resection.   Patient s/p Whipple with right hemicolectomy on 12/04/2024. 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 Post-op patient sustained 2 vasovagal episodes (also had "fainting spells" with chemo); cardiac and neuro workup negative. Decreased Lantus (follows with endo) and stopped Creon at discharge. Was having normal BMs.   Labs: 6/5/24 (post ERCP/stent): Ca 19-9: 433 CEA: --; AST: 135, ALT: 296, ALP: 304, Tbili: 6.5. 6/12/24 - CEA: 2.1, Ca 19-9: 445 7/16/24 - CEA: 1.2, Ca 19-9: 308 8/13/24 - CEA: 2.9, Ca 19-9: 283 8/27/24 - CEA: 4.9, Ca 19-9: 334 9/24/24 - CEA: 4.5, Ca 19-9: 234 10/8/24 - CEA: 4.9, Ca 19-9: 167 10/22/24 - CEA: 6.2, Ca 19-9: 175  Patient presents for postoperative evaluation. Needs to f/u with med/onc for different chemo regimen and rad/onc. Reports vomiting after eating Chinese food 2 days ago, not consistently taking Creon and was taking after meals. Having multiple bowel movements a day but was also taking stool softeners which he stopped last night. Complains of intermittent back pain, relieved by heating packs. Has not been taking Tylenol and ibuprofen consistently. Stopped oxycodone a week ago.  Blood sugars in 130-140s. He has been walking around the house, trying to stay active.

## 2025-01-24 NOTE — HISTORY OF PRESENT ILLNESS
[Disease: _____________________] : Disease: [unfilled] [de-identified] : This is a 51 yo male with hx of pancreatic head mass which on biopsy is consistent with adenocarcinoma who presents for evaluation and management. 06/02/2024, CT-Scan of Abdomen/Pelvis w IV Contrast: 2 cm circumscribed hypodense mass within the head of the pancreas suspicious for pancreatic carcinoma resulting in moderate dilatation of the main pancreatic duct, biliary system as well as the gallbladder. Mild peripancreatic lymphadenopathy possibly metastatic in nature. Finding suspicious for mild segmental colitis involving descending and  sigmoid colon.   06/02/2024, CT-Scan of Chest: A 10 X 8 X 6 mm mildly spiculated nodule in the right lower lobe which is suspicious. Further evaluation with PET/CT is recommended. Alternatively, short-term follow-up with chest CT in 3 months may be considered. No other metastatic lesion in the chest. Partially visualized pancreatic head mass with dilatation of the main pancreatic duct.   06/04/2024, ERCP: The major papilla was native. The major papilla had a normal appearance. The common bile duct was deeply cannulated using a traction sphincterotome with guidewire. No bleeding was observed. Localized, extrinsic and severe stricture was visualized in the distal common bile duct. The stricture was traversable by wire. Performed brushing with cytology brush in the distal common bile duct. One 10 mm x 4mm fully covered metal stent was placed in the distal common bile duct. The pathology report from that was as follows: Distal common bile duct brushing for cytology: Positive for Adenocarcinoma.   06/10/2024, MRI of Abdomen: In the head of pancreas, there is a 2.5 X 2.1 cm heterogenous mass suggestive of malignancy. Pancreatic duct is dilated up to 10 mm. Status post CBD stent placement. There is significant improvemnt in the intrahepatic biliary dilatation. Right lower lobe  lung nodules is incompletely imaged.  [de-identified] : Since the last visit the pt has recovered from Whipple for his ca pancreas. Pt has back pain. Pt says he is losing muscle mass and has back pain present.Pt had fall  in [facility]

## 2025-01-24 NOTE — ASSESSMENT
[Curative] : Goals of care discussed with patient: Curative [FreeTextEntry1] : A discussion took place with the patient who came alone regarding further management.  He is doing well post Whipple procedure and has no significant abdominal pain.  His appetite is increasing and his weight has been stable.  We did review his pathology from the Whipple procedure which shows that the path revealed residual pancreatic carcinoma moderate to poorly differentiated with a treatment score of 3 indicating no tumor regression.  There was 7 out of 20 positive lymph nodes.  The tumor was 3.5 cm.  Lymphovascular invasion perineural invasion was present and the margins were positive and the pancreatic neck uncinated, SMI bile duct and vascular groove.  The staging was T2 N2.  As a result the patient is a candidate for adjuvant treatment and because of the poor tumor response to chemotherapy with FOLFIRINOX we recommended he was a candidate for gemcitabine and capecitabine.  The schedule was discussed and he will receive weekly treatments 3 weeks on 1 week off with capecitabine for 21 days.  Side effects and toxicity were discussed in detail.  The patient will be going for a quick holiday to [place] but he wishes to begin treatment before he leaves and this will be scheduled.  He will return every 2 weeks during treatment for evaluation.  The patient will also be a candidate for radiation oncology consultation which will be scheduled.

## 2025-01-24 NOTE — PHYSICAL EXAM
[Ambulatory and capable of all self care but unable to carry out any work activities] : Status 2- Ambulatory and capable of all self care but unable to carry out any work activities. Up and about more than 50% of waking hours [Normal] : affect appropriate [de-identified] : healed abdominal oincision

## 2025-01-24 NOTE — HISTORY OF PRESENT ILLNESS
[Disease: _____________________] : Disease: [unfilled] [de-identified] : This is a 53 yo male with hx of pancreatic head mass which on biopsy is consistent with adenocarcinoma who presents for evaluation and management. 06/02/2024, CT-Scan of Abdomen/Pelvis w IV Contrast: 2 cm circumscribed hypodense mass within the head of the pancreas suspicious for pancreatic carcinoma resulting in moderate dilatation of the main pancreatic duct, biliary system as well as the gallbladder. Mild peripancreatic lymphadenopathy possibly metastatic in nature. Finding suspicious for mild segmental colitis involving descending and  sigmoid colon.   06/02/2024, CT-Scan of Chest: A 10 X 8 X 6 mm mildly spiculated nodule in the right lower lobe which is suspicious. Further evaluation with PET/CT is recommended. Alternatively, short-term follow-up with chest CT in 3 months may be considered. No other metastatic lesion in the chest. Partially visualized pancreatic head mass with dilatation of the main pancreatic duct.   06/04/2024, ERCP: The major papilla was native. The major papilla had a normal appearance. The common bile duct was deeply cannulated using a traction sphincterotome with guidewire. No bleeding was observed. Localized, extrinsic and severe stricture was visualized in the distal common bile duct. The stricture was traversable by wire. Performed brushing with cytology brush in the distal common bile duct. One 10 mm x 4mm fully covered metal stent was placed in the distal common bile duct. The pathology report from that was as follows: Distal common bile duct brushing for cytology: Positive for Adenocarcinoma.   06/10/2024, MRI of Abdomen: In the head of pancreas, there is a 2.5 X 2.1 cm heterogenous mass suggestive of malignancy. Pancreatic duct is dilated up to 10 mm. Status post CBD stent placement. There is significant improvemnt in the intrahepatic biliary dilatation. Right lower lobe  lung nodules is incompletely imaged.  [de-identified] : Since the last visit the pt has recovered from Whipple for his ca pancreas. Pt has back pain. Pt says he is losing muscle mass and has back pain present.Pt had fall  in [facility]

## 2025-01-24 NOTE — REVIEW OF SYSTEMS
[Diarrhea: Grade 0] : Diarrhea: Grade 0 [Anxiety] : anxiety [Negative] : Allergic/Immunologic [FreeTextEntry7] : occ heartburn, nl BM [de-identified] : has numbness in hands and feet, able to walk  [FreeTextEntry9] : has back pain and takes advil 600 mg and heating pad

## 2025-01-24 NOTE — PHYSICAL EXAM
This 92 years old male with PMH of Colon Cancer s/p Resection, Hiatal Hernia, HTN and HLD brought by family with right upper quadrant and right shoulder pain    had sonographic moy sign, but negative HIDA  right sided flank pain  CT scan with large collection,  s/p placement of IR drain   ? colonic source such as possible microperforation, then closed.   ? biliary perforation then closed    abscess cultures sent  klebsiella, also enterococcus faecalis SS to ampicillin  CT scan without extraluminal contrast  s/p placement of a percutaneous biliary drain    Biliary drain culture with Streptococcus species, PRELIM VRE  - stop Unasyn    Start Ertapenem 1000gram IV Q24H  start Linezolid 600mg PO Q12H  both - last dose on 6/12/19, inclusively.   Can pull out midline at end of all antibiotics         Continue to maintain drain.       no contraindications to discharge to community from ID standpoint [Ambulatory and capable of all self care but unable to carry out any work activities] : Status 2- Ambulatory and capable of all self care but unable to carry out any work activities. Up and about more than 50% of waking hours [Normal] : affect appropriate [de-identified] : healed abdominal oincision

## 2025-01-24 NOTE — REVIEW OF SYSTEMS
[Diarrhea: Grade 0] : Diarrhea: Grade 0 [Anxiety] : anxiety [Negative] : Allergic/Immunologic [FreeTextEntry7] : occ heartburn, nl BM [de-identified] : has numbness in hands and feet, able to walk  [FreeTextEntry9] : has back pain and takes advil 600 mg and heating pad

## 2025-02-05 NOTE — HISTORY OF PRESENT ILLNESS
[Disease: _____________________] : Disease: [unfilled] [de-identified] : This is a 51 yo male with hx of pancreatic head mass which on biopsy is consistent with adenocarcinoma who presents for evaluation and management. 06/02/2024, CT-Scan of Abdomen/Pelvis w IV Contrast: 2 cm circumscribed hypodense mass within the head of the pancreas suspicious for pancreatic carcinoma resulting in moderate dilatation of the main pancreatic duct, biliary system as well as the gallbladder. Mild peripancreatic lymphadenopathy possibly metastatic in nature. Finding suspicious for mild segmental colitis involving descending and  sigmoid colon.   06/02/2024, CT-Scan of Chest: A 10 X 8 X 6 mm mildly spiculated nodule in the right lower lobe which is suspicious. Further evaluation with PET/CT is recommended. Alternatively, short-term follow-up with chest CT in 3 months may be considered. No other metastatic lesion in the chest. Partially visualized pancreatic head mass with dilatation of the main pancreatic duct.   06/04/2024, ERCP: The major papilla was native. The major papilla had a normal appearance. The common bile duct was deeply cannulated using a traction sphincterotome with guidewire. No bleeding was observed. Localized, extrinsic and severe stricture was visualized in the distal common bile duct. The stricture was traversable by wire. Performed brushing with cytology brush in the distal common bile duct. One 10 mm x 4mm fully covered metal stent was placed in the distal common bile duct. The pathology report from that was as follows: Distal common bile duct brushing for cytology: Positive for Adenocarcinoma.   06/10/2024, MRI of Abdomen: In the head of pancreas, there is a 2.5 X 2.1 cm heterogenous mass suggestive of malignancy. Pancreatic duct is dilated up to 10 mm. Status post CBD stent placement. There is significant improvemnt in the intrahepatic biliary dilatation. Right lower lobe  lung nodules is incompletely imaged.  [de-identified] : The patient returns today for chemotherapy which has to be held because of negative blood counts.

## 2025-02-05 NOTE — ASSESSMENT
[FreeTextEntry1] : The patient has begun chemotherapy with Gemzar and Xeloda as adjuvant treatment for his resected pancreatic carcinoma.  He had 1 dose of the Gemzar and now his blood counts are negative and the chemotherapy will be held this week.  The patient also had CAT scan of the chest abdomen pelvis which now shows possible nodule in the pancreatic surgical bed which could be postop changes or could be recurrent disease.  As a result we discussed with the patient changing his treatment to Gemzar Abraxane which would be more potentially beneficial if this were metastatic disease. [Curative] : Goals of care discussed with patient: Curative

## 2025-03-05 NOTE — HISTORY OF PRESENT ILLNESS
[Disease: _____________________] : Disease: [unfilled] [de-identified] : This is a 53 yo male with hx of pancreatic head mass which on biopsy is consistent with adenocarcinoma who presents for evaluation and management. 06/02/2024, CT-Scan of Abdomen/Pelvis w IV Contrast: 2 cm circumscribed hypodense mass within the head of the pancreas suspicious for pancreatic carcinoma resulting in moderate dilatation of the main pancreatic duct, biliary system as well as the gallbladder. Mild peripancreatic lymphadenopathy possibly metastatic in nature. Finding suspicious for mild segmental colitis involving descending and  sigmoid colon.   06/02/2024, CT-Scan of Chest: A 10 X 8 X 6 mm mildly spiculated nodule in the right lower lobe which is suspicious. Further evaluation with PET/CT is recommended. Alternatively, short-term follow-up with chest CT in 3 months may be considered. No other metastatic lesion in the chest. Partially visualized pancreatic head mass with dilatation of the main pancreatic duct.   06/04/2024, ERCP: The major papilla was native. The major papilla had a normal appearance. The common bile duct was deeply cannulated using a traction sphincterotome with guidewire. No bleeding was observed. Localized, extrinsic and severe stricture was visualized in the distal common bile duct. The stricture was traversable by wire. Performed brushing with cytology brush in the distal common bile duct. One 10 mm x 4mm fully covered metal stent was placed in the distal common bile duct. The pathology report from that was as follows: Distal common bile duct brushing for cytology: Positive for Adenocarcinoma.   06/10/2024, MRI of Abdomen: In the head of pancreas, there is a 2.5 X 2.1 cm heterogenous mass suggestive of malignancy. Pancreatic duct is dilated up to 10 mm. Status post CBD stent placement. There is significant improvemnt in the intrahepatic biliary dilatation. Right lower lobe  lung nodules is incompletely imaged.  [de-identified] : The patient has returned for follow up. He will start a new treatment of adjuvant chemotherapy. Presently, he has increased episodes of diarrhea, and abdominal pain on/off. He takes Oxycodone 5 mg as needed that does give him relief. He will need a refill. His appetite is decreased, and he is still losing weight.

## 2025-03-05 NOTE — ASSESSMENT
[Curative] : Goals of care discussed with patient: Curative [FreeTextEntry1] : The patient is stable to start C1D1 Gemzar/Abraxane as adjuvant treatment for his resected pancreatic carcinoma.  We will continue to monitor the patient for side effects and toxicities.  He has diarrhea on/off. He was advised to take Imodium 2 m tabs as needed. He will continue Creon as directed. He has abdominal pain. I refilled Oxycodone 5 mg pills. Today, I ordered repeat blood tests with tumor markers, f/u. We will order a repeat CT-Scan of Abdomen/Pelvis/Chest to assess for progression vs regression of disease, after 2 months of treatment.  The patient will RTO in 1 week.

## 2025-03-11 NOTE — HISTORY OF PRESENT ILLNESS
[Disease: _____________________] : Disease: [unfilled] [de-identified] : This is a 51 yo male with hx of pancreatic head mass which on biopsy is consistent with adenocarcinoma who presents for evaluation and management. 06/02/2024, CT-Scan of Abdomen/Pelvis w IV Contrast: 2 cm circumscribed hypodense mass within the head of the pancreas suspicious for pancreatic carcinoma resulting in moderate dilatation of the main pancreatic duct, biliary system as well as the gallbladder. Mild peripancreatic lymphadenopathy possibly metastatic in nature. Finding suspicious for mild segmental colitis involving descending and  sigmoid colon.   06/02/2024, CT-Scan of Chest: A 10 X 8 X 6 mm mildly spiculated nodule in the right lower lobe which is suspicious. Further evaluation with PET/CT is recommended. Alternatively, short-term follow-up with chest CT in 3 months may be considered. No other metastatic lesion in the chest. Partially visualized pancreatic head mass with dilatation of the main pancreatic duct.   06/04/2024, ERCP: The major papilla was native. The major papilla had a normal appearance. The common bile duct was deeply cannulated using a traction sphincterotome with guidewire. No bleeding was observed. Localized, extrinsic and severe stricture was visualized in the distal common bile duct. The stricture was traversable by wire. Performed brushing with cytology brush in the distal common bile duct. One 10 mm x 4mm fully covered metal stent was placed in the distal common bile duct. The pathology report from that was as follows: Distal common bile duct brushing for cytology: Positive for Adenocarcinoma.   06/10/2024, MRI of Abdomen: In the head of pancreas, there is a 2.5 X 2.1 cm heterogenous mass suggestive of malignancy. Pancreatic duct is dilated up to 10 mm. Status post CBD stent placement. There is significant improvemnt in the intrahepatic biliary dilatation. Right lower lobe  lung nodules is incompletely imaged.  [de-identified] : The patient has returned for follow up. Presently, he still has loose bowels and watery stools ~ 3 times/day, He has some abdominal pain.  He needs refill of Oxycodone 5 mg that does give him relief. His appetite is decreased, and he is still losing weight.

## 2025-03-11 NOTE — PHYSICAL EXAM
[Thin] : thin [Normal] : affect appropriate [de-identified] : decreased moisture to oral cavity, no thrush [de-identified] : +well healed incisional scars, nondistended, +BS, nontender to palpation.

## 2025-03-11 NOTE — HISTORY OF PRESENT ILLNESS
[Disease: _____________________] : Disease: [unfilled] [de-identified] : This is a 51 yo male with hx of pancreatic head mass which on biopsy is consistent with adenocarcinoma who presents for evaluation and management. 06/02/2024, CT-Scan of Abdomen/Pelvis w IV Contrast: 2 cm circumscribed hypodense mass within the head of the pancreas suspicious for pancreatic carcinoma resulting in moderate dilatation of the main pancreatic duct, biliary system as well as the gallbladder. Mild peripancreatic lymphadenopathy possibly metastatic in nature. Finding suspicious for mild segmental colitis involving descending and  sigmoid colon.   06/02/2024, CT-Scan of Chest: A 10 X 8 X 6 mm mildly spiculated nodule in the right lower lobe which is suspicious. Further evaluation with PET/CT is recommended. Alternatively, short-term follow-up with chest CT in 3 months may be considered. No other metastatic lesion in the chest. Partially visualized pancreatic head mass with dilatation of the main pancreatic duct.   06/04/2024, ERCP: The major papilla was native. The major papilla had a normal appearance. The common bile duct was deeply cannulated using a traction sphincterotome with guidewire. No bleeding was observed. Localized, extrinsic and severe stricture was visualized in the distal common bile duct. The stricture was traversable by wire. Performed brushing with cytology brush in the distal common bile duct. One 10 mm x 4mm fully covered metal stent was placed in the distal common bile duct. The pathology report from that was as follows: Distal common bile duct brushing for cytology: Positive for Adenocarcinoma.   06/10/2024, MRI of Abdomen: In the head of pancreas, there is a 2.5 X 2.1 cm heterogenous mass suggestive of malignancy. Pancreatic duct is dilated up to 10 mm. Status post CBD stent placement. There is significant improvemnt in the intrahepatic biliary dilatation. Right lower lobe  lung nodules is incompletely imaged.  [de-identified] : The patient has returned for follow up. Presently, he still has loose bowels and watery stools ~ 3 times/day, He has some abdominal pain.  He needs refill of Oxycodone 5 mg that does give him relief. His appetite is decreased, and he is still losing weight.

## 2025-03-11 NOTE — ASSESSMENT
[Curative] : Goals of care discussed with patient: Curative [FreeTextEntry1] : S/p C1D Gemzar/Abraxane as adjuvant treatment for his resected pancreatic carcinoma.  The parient is stable for C1D8. We will continue to monitor the patient for side effects and toxicities.  He has diarrhea on/off. I will give 1 liter if NS with treatment today.  He was advised to take Imodium 2 m tabs daily for loose BMs. He will continue Creon as directed.  Today, I ordered repeat blood tests with tumor markers, f/u. We will order a repeat CT-Scan of Abdomen/Pelvis/Chest to assess for progression vs regression of disease, after 2 months of treatment.  The patient will RTO in 1 week.

## 2025-03-11 NOTE — REVIEW OF SYSTEMS
[Diarrhea: Grade 0] : Diarrhea: Grade 0 [Negative] : Allergic/Immunologic [Abdominal Pain] : abdominal pain [Vomiting] : no vomiting [Diarrhea: Grade 1 - Increase of <4 stools per day over baseline; mild increase in ostomy output compared to baseline] : Diarrhea: Grade 1 - Increase of <4 stools per day over baseline; mild increase in ostomy output compared to baseline

## 2025-03-11 NOTE — PHYSICAL EXAM
[Thin] : thin [Normal] : affect appropriate [de-identified] : decreased moisture to oral cavity, no thrush [de-identified] : +well healed incisional scars, nondistended, +BS, nontender to palpation.

## 2025-03-18 NOTE — ASSESSMENT
[FreeTextEntry1] : The patient continues on chemotherapy with salvage treatment including Gemzar Abraxane status post initial FOLFIRINOX and surgical resection.  The patient has developed recurrent disease in the pancreatic bed causing a change of adjuvant treatment to metastatic treatment which he currently is tolerating well.  The patient states he has been having diarrhea from the treatment but is able to control it with Imodium.  He did mention he had dark stools with his bowel movements and as result the rectal exam was done with difficulty due to his local pain and tenderness from diarrhea and the Hemoccult test was negative for occult blood.  Hemoglobin however has fallen to 8.4.  We discussed giving the patient a transfusion but he wishes to hold and repeat the blood test next week.  Currently his platelet count is 59,000 and as result chemotherapy will be held this week.  He will return in 1 week or sooner as needed.  The [Palliative] : Goals of care discussed with patient: Palliative [Palliative Care Plan] : not applicable at this time

## 2025-03-18 NOTE — REVIEW OF SYSTEMS
[Diarrhea: Grade 0] : Diarrhea: Grade 0 [Negative] : Allergic/Immunologic [FreeTextEntry7] : has diarrhea [de-identified] : has numbness, tingling

## 2025-03-18 NOTE — HISTORY OF PRESENT ILLNESS
[Disease: _____________________] : Disease: [unfilled] [de-identified] : This is a 53 yo male with hx of pancreatic head mass which on biopsy is consistent with adenocarcinoma who presents for evaluation and management. 06/02/2024, CT-Scan of Abdomen/Pelvis w IV Contrast: 2 cm circumscribed hypodense mass within the head of the pancreas suspicious for pancreatic carcinoma resulting in moderate dilatation of the main pancreatic duct, biliary system as well as the gallbladder. Mild peripancreatic lymphadenopathy possibly metastatic in nature. Finding suspicious for mild segmental colitis involving descending and  sigmoid colon.   06/02/2024, CT-Scan of Chest: A 10 X 8 X 6 mm mildly spiculated nodule in the right lower lobe which is suspicious. Further evaluation with PET/CT is recommended. Alternatively, short-term follow-up with chest CT in 3 months may be considered. No other metastatic lesion in the chest. Partially visualized pancreatic head mass with dilatation of the main pancreatic duct.   06/04/2024, ERCP: The major papilla was native. The major papilla had a normal appearance. The common bile duct was deeply cannulated using a traction sphincterotome with guidewire. No bleeding was observed. Localized, extrinsic and severe stricture was visualized in the distal common bile duct. The stricture was traversable by wire. Performed brushing with cytology brush in the distal common bile duct. One 10 mm x 4mm fully covered metal stent was placed in the distal common bile duct. The pathology report from that was as follows: Distal common bile duct brushing for cytology: Positive for Adenocarcinoma.   06/10/2024, MRI of Abdomen: In the head of pancreas, there is a 2.5 X 2.1 cm heterogenous mass suggestive of malignancy. Pancreatic duct is dilated up to 10 mm. Status post CBD stent placement. There is significant improvemnt in the intrahepatic biliary dilatation. Right lower lobe  lung nodules is incompletely imaged.  [de-identified] : Since last visit the pt has diarrhea x 1 with dark stools,

## 2025-03-18 NOTE — PHYSICAL EXAM
Spinal Block    Patient location during procedure: OB  Start time: 6/11/2018 7:31 AM  End time: 6/11/2018 7:36 AM  Staffing  Anesthesiologist: Stephany Maldonado  Resident/CRNA: Mandi Pisano  Performed: anesthesiologist   Preanesthetic Checklist  Completed: patient identified, site marked, surgical consent, pre-op evaluation, timeout performed, IV checked, risks and benefits discussed and monitors and equipment checked  Spinal Block  Patient position: sitting  Prep: Betadine  Patient monitoring: heart rate, frequent blood pressure checks and continuous pulse ox  Approach: midline  Location: L3-4  Injection technique: single-shot  Needle  Needle type: pencil-tip   Needle gauge: 24 G  Needle length: 10 cm  Assessment  Injection Assessment:  negative aspiration for heme, no paresthesia on injection and positive aspiration for clear CSF  Additional Notes  DURAMORPH   15 MG ADDED TO BUPIVACAINE   75% 1 6 cc with dextrose, b le kit [Ambulatory and capable of all self care but unable to carry out any work activities] : Status 2- Ambulatory and capable of all self care but unable to carry out any work activities. Up and about more than 50% of waking hours [Normal] : affect appropriate [Thin] : thin

## 2025-03-27 NOTE — REVIEW OF SYSTEMS
[Diarrhea: Grade 0] : Diarrhea: Grade 0 [Negative] : Allergic/Immunologic [FreeTextEntry7] : has diarrhea [de-identified] : has numbness, tingling

## 2025-03-27 NOTE — ASSESSMENT
[Palliative] : Goals of care discussed with patient: Palliative [Palliative Care Plan] : not applicable at this time [FreeTextEntry1] : The patient is stable to restart chemotherapy treatment of Gemzar/Abraxane.  His platelet counts are WNL. The patient continues on chemotherapy with salvage treatment including Gemzar Abraxane status post initial FOLFIRINOX and surgical resection.  The patient has developed recurrent disease in the pancreatic bed causing a change of adjuvant treatment to metastatic treatment which he currently is tolerating well.  We will continue to monitor the patient for side effects and toxicities. The patient states he still has watery stools and he has been taking the Imodium 2 mg which still has not controlled the watery stools. I will send a RX Lomotil to his local pharmacy to start.  He will continue Oxycodone 5 mg every 6 hours as needed for pain.  We will order repeat CT-Scan of abdomen/pelvis/chest after completing this cycle to assess for progression vs regression of disease. Today, I ordered repeat blood tests with tumor markers, f/u.  He will return in 1 week or sooner as needed.    Patient being seen as per physician's primary plan of care.

## 2025-03-27 NOTE — HISTORY OF PRESENT ILLNESS
[Disease: _____________________] : Disease: [unfilled] [de-identified] : This is a 53 yo male with hx of pancreatic head mass which on biopsy is consistent with adenocarcinoma who presents for evaluation and management. 06/02/2024, CT-Scan of Abdomen/Pelvis w IV Contrast: 2 cm circumscribed hypodense mass within the head of the pancreas suspicious for pancreatic carcinoma resulting in moderate dilatation of the main pancreatic duct, biliary system as well as the gallbladder. Mild peripancreatic lymphadenopathy possibly metastatic in nature. Finding suspicious for mild segmental colitis involving descending and  sigmoid colon.   06/02/2024, CT-Scan of Chest: A 10 X 8 X 6 mm mildly spiculated nodule in the right lower lobe which is suspicious. Further evaluation with PET/CT is recommended. Alternatively, short-term follow-up with chest CT in 3 months may be considered. No other metastatic lesion in the chest. Partially visualized pancreatic head mass with dilatation of the main pancreatic duct.   06/04/2024, ERCP: The major papilla was native. The major papilla had a normal appearance. The common bile duct was deeply cannulated using a traction sphincterotome with guidewire. No bleeding was observed. Localized, extrinsic and severe stricture was visualized in the distal common bile duct. The stricture was traversable by wire. Performed brushing with cytology brush in the distal common bile duct. One 10 mm x 4mm fully covered metal stent was placed in the distal common bile duct. The pathology report from that was as follows: Distal common bile duct brushing for cytology: Positive for Adenocarcinoma.   06/10/2024, MRI of Abdomen: In the head of pancreas, there is a 2.5 X 2.1 cm heterogenous mass suggestive of malignancy. Pancreatic duct is dilated up to 10 mm. Status post CBD stent placement. There is significant improvemnt in the intrahepatic biliary dilatation. Right lower lobe  lung nodules is incompletely imaged.  [de-identified] : The patient was seen in the treatment room. He was held from treatment for 1 week due to low platelet counts=52. Presently, he feels better and he has been eating more. He admits to gaining 7 pounds since last week. He still has watery stool with each BM. He denies any blood or melena. He has been using the Proctozone-HC cream to his external hemorrhoids, and warm sitz bath with some improvement in rectal pain.

## 2025-03-27 NOTE — HISTORY OF PRESENT ILLNESS
[Disease: _____________________] : Disease: [unfilled] [de-identified] : This is a 53 yo male with hx of pancreatic head mass which on biopsy is consistent with adenocarcinoma who presents for evaluation and management. 06/02/2024, CT-Scan of Abdomen/Pelvis w IV Contrast: 2 cm circumscribed hypodense mass within the head of the pancreas suspicious for pancreatic carcinoma resulting in moderate dilatation of the main pancreatic duct, biliary system as well as the gallbladder. Mild peripancreatic lymphadenopathy possibly metastatic in nature. Finding suspicious for mild segmental colitis involving descending and  sigmoid colon.   06/02/2024, CT-Scan of Chest: A 10 X 8 X 6 mm mildly spiculated nodule in the right lower lobe which is suspicious. Further evaluation with PET/CT is recommended. Alternatively, short-term follow-up with chest CT in 3 months may be considered. No other metastatic lesion in the chest. Partially visualized pancreatic head mass with dilatation of the main pancreatic duct.   06/04/2024, ERCP: The major papilla was native. The major papilla had a normal appearance. The common bile duct was deeply cannulated using a traction sphincterotome with guidewire. No bleeding was observed. Localized, extrinsic and severe stricture was visualized in the distal common bile duct. The stricture was traversable by wire. Performed brushing with cytology brush in the distal common bile duct. One 10 mm x 4mm fully covered metal stent was placed in the distal common bile duct. The pathology report from that was as follows: Distal common bile duct brushing for cytology: Positive for Adenocarcinoma.   06/10/2024, MRI of Abdomen: In the head of pancreas, there is a 2.5 X 2.1 cm heterogenous mass suggestive of malignancy. Pancreatic duct is dilated up to 10 mm. Status post CBD stent placement. There is significant improvemnt in the intrahepatic biliary dilatation. Right lower lobe  lung nodules is incompletely imaged.  [de-identified] : The patient was seen in the treatment room. He was held from treatment for 1 week due to low platelet counts=52. Presently, he feels better and he has been eating more. He admits to gaining 7 pounds since last week. He still has watery stool with each BM. He denies any blood or melena. He has been using the Proctozone-HC cream to his external hemorrhoids, and warm sitz bath with some improvement in rectal pain.

## 2025-03-27 NOTE — REVIEW OF SYSTEMS
[Diarrhea: Grade 0] : Diarrhea: Grade 0 [Negative] : Allergic/Immunologic [FreeTextEntry7] : has diarrhea [de-identified] : has numbness, tingling

## 2025-04-02 NOTE — HISTORY OF PRESENT ILLNESS
[Disease: _____________________] : Disease: [unfilled] [de-identified] : This is a 53 yo male with hx of pancreatic head mass which on biopsy is consistent with adenocarcinoma who presents for evaluation and management. 06/02/2024, CT-Scan of Abdomen/Pelvis w IV Contrast: 2 cm circumscribed hypodense mass within the head of the pancreas suspicious for pancreatic carcinoma resulting in moderate dilatation of the main pancreatic duct, biliary system as well as the gallbladder. Mild peripancreatic lymphadenopathy possibly metastatic in nature. Finding suspicious for mild segmental colitis involving descending and  sigmoid colon.   06/02/2024, CT-Scan of Chest: A 10 X 8 X 6 mm mildly spiculated nodule in the right lower lobe which is suspicious. Further evaluation with PET/CT is recommended. Alternatively, short-term follow-up with chest CT in 3 months may be considered. No other metastatic lesion in the chest. Partially visualized pancreatic head mass with dilatation of the main pancreatic duct.   06/04/2024, ERCP: The major papilla was native. The major papilla had a normal appearance. The common bile duct was deeply cannulated using a traction sphincterotome with guidewire. No bleeding was observed. Localized, extrinsic and severe stricture was visualized in the distal common bile duct. The stricture was traversable by wire. Performed brushing with cytology brush in the distal common bile duct. One 10 mm x 4mm fully covered metal stent was placed in the distal common bile duct. The pathology report from that was as follows: Distal common bile duct brushing for cytology: Positive for Adenocarcinoma.   06/10/2024, MRI of Abdomen: In the head of pancreas, there is a 2.5 X 2.1 cm heterogenous mass suggestive of malignancy. Pancreatic duct is dilated up to 10 mm. Status post CBD stent placement. There is significant improvemnt in the intrahepatic biliary dilatation. Right lower lobe  lung nodules is incompletely imaged.  [de-identified] : Patient is here for f/u. The patient feels okay.  They deny any pain, nausea or vomiting. He admits to gaining 7 pounds since last week.  He denies any blood or melena. He has some improvement with the loose stools/diarrhea with Lomotil.

## 2025-04-02 NOTE — ASSESSMENT
[Palliative] : Goals of care discussed with patient: Palliative [Palliative Care Plan] : not applicable at this time [FreeTextEntry1] : S/p C4D1 Gemzar/Abraxane.  He is stable for C4D8 Gemzar/Abraxane. His platelet counts are WNL. The patient continues on chemotherapy with salvage treatment. The patient developed recurrent disease in the pancreatic bed causing a change of adjuvant treatment to metastatic treatment. We will continue to monitor the patient for side effects and toxicities. The patient states he still has watery stools, but it has decrease with use of Lomotil.    He will continue Oxycodone 5 mg every 6 hours as needed for pain.  We will order repeat CT-Scan of abdomen/pelvis/chest after completing this cycle to assess for progression vs regression of disease. Today, I ordered repeat blood tests with tumor markers, f/u.  He will return in 1 week or sooner as needed.    Patient being seen as per physician's primary plan of care.

## 2025-04-02 NOTE — REVIEW OF SYSTEMS
[Diarrhea: Grade 0] : Diarrhea: Grade 0 [Negative] : Allergic/Immunologic [FreeTextEntry7] : has diarrhea [de-identified] : has numbness, tingling

## 2025-04-02 NOTE — HISTORY OF PRESENT ILLNESS
[Disease: _____________________] : Disease: [unfilled] [de-identified] : This is a 51 yo male with hx of pancreatic head mass which on biopsy is consistent with adenocarcinoma who presents for evaluation and management. 06/02/2024, CT-Scan of Abdomen/Pelvis w IV Contrast: 2 cm circumscribed hypodense mass within the head of the pancreas suspicious for pancreatic carcinoma resulting in moderate dilatation of the main pancreatic duct, biliary system as well as the gallbladder. Mild peripancreatic lymphadenopathy possibly metastatic in nature. Finding suspicious for mild segmental colitis involving descending and  sigmoid colon.   06/02/2024, CT-Scan of Chest: A 10 X 8 X 6 mm mildly spiculated nodule in the right lower lobe which is suspicious. Further evaluation with PET/CT is recommended. Alternatively, short-term follow-up with chest CT in 3 months may be considered. No other metastatic lesion in the chest. Partially visualized pancreatic head mass with dilatation of the main pancreatic duct.   06/04/2024, ERCP: The major papilla was native. The major papilla had a normal appearance. The common bile duct was deeply cannulated using a traction sphincterotome with guidewire. No bleeding was observed. Localized, extrinsic and severe stricture was visualized in the distal common bile duct. The stricture was traversable by wire. Performed brushing with cytology brush in the distal common bile duct. One 10 mm x 4mm fully covered metal stent was placed in the distal common bile duct. The pathology report from that was as follows: Distal common bile duct brushing for cytology: Positive for Adenocarcinoma.   06/10/2024, MRI of Abdomen: In the head of pancreas, there is a 2.5 X 2.1 cm heterogenous mass suggestive of malignancy. Pancreatic duct is dilated up to 10 mm. Status post CBD stent placement. There is significant improvemnt in the intrahepatic biliary dilatation. Right lower lobe  lung nodules is incompletely imaged.  [de-identified] : Patient is here for f/u. The patient feels okay.  They deny any pain, nausea or vomiting. He admits to gaining 7 pounds since last week.  He denies any blood or melena. He has some improvement with the loose stools/diarrhea with Lomotil.

## 2025-04-02 NOTE — REVIEW OF SYSTEMS
[Diarrhea: Grade 0] : Diarrhea: Grade 0 [Negative] : Allergic/Immunologic [FreeTextEntry7] : has diarrhea [de-identified] : has numbness, tingling

## 2025-04-09 NOTE — HISTORY OF PRESENT ILLNESS
[Disease: _____________________] : Disease: [unfilled] [de-identified] : This is a 53 yo male with hx of pancreatic head mass which on biopsy is consistent with adenocarcinoma who presents for evaluation and management. 06/02/2024, CT-Scan of Abdomen/Pelvis w IV Contrast: 2 cm circumscribed hypodense mass within the head of the pancreas suspicious for pancreatic carcinoma resulting in moderate dilatation of the main pancreatic duct, biliary system as well as the gallbladder. Mild peripancreatic lymphadenopathy possibly metastatic in nature. Finding suspicious for mild segmental colitis involving descending and  sigmoid colon.   06/02/2024, CT-Scan of Chest: A 10 X 8 X 6 mm mildly spiculated nodule in the right lower lobe which is suspicious. Further evaluation with PET/CT is recommended. Alternatively, short-term follow-up with chest CT in 3 months may be considered. No other metastatic lesion in the chest. Partially visualized pancreatic head mass with dilatation of the main pancreatic duct.   06/04/2024, ERCP: The major papilla was native. The major papilla had a normal appearance. The common bile duct was deeply cannulated using a traction sphincterotome with guidewire. No bleeding was observed. Localized, extrinsic and severe stricture was visualized in the distal common bile duct. The stricture was traversable by wire. Performed brushing with cytology brush in the distal common bile duct. One 10 mm x 4mm fully covered metal stent was placed in the distal common bile duct. The pathology report from that was as follows: Distal common bile duct brushing for cytology: Positive for Adenocarcinoma.   06/10/2024, MRI of Abdomen: In the head of pancreas, there is a 2.5 X 2.1 cm heterogenous mass suggestive of malignancy. Pancreatic duct is dilated up to 10 mm. Status post CBD stent placement. There is significant improvemnt in the intrahepatic biliary dilatation. Right lower lobe  lung nodules is incompletely imaged.  [de-identified] : Patient is here for follow up. His blood counts today are not within treatable parameters. WBC=2.70, Hgb=7.4, PLT=35. We will hold treatment today; he will be given blood transfusion 2 units [facility] on Saturday 4/12/2025

## 2025-04-09 NOTE — REVIEW OF SYSTEMS
[Diarrhea: Grade 0] : Diarrhea: Grade 0 [Negative] : Allergic/Immunologic [FreeTextEntry7] : has diarrhea [de-identified] : has numbness, tingling

## 2025-04-09 NOTE — ASSESSMENT
[FreeTextEntry1] : S/p C4D8 Gemzar/Abraxane. The patient is not cleared for treatment today.  His blood counts today are not within treatable parameters. WBC=2.70, Hgb=7.4, PLT=35.  He will be given blood transfusion 2 units [facility] on Saturday 4/12/2025. The patient will RTO in 1 week.   Patient being seen as per physician's primary plan of care. [Palliative] : Goals of care discussed with patient: Palliative [Palliative Care Plan] : not applicable at this time

## 2025-04-15 NOTE — ASSESSMENT
[FreeTextEntry1] : The patient will continue Abraxane and be monitored for side effects and toxicity.  He will be due for repeat scans in May 2025.  He has been having increased diarrhea and soreness in the rectal area for which prescription for Proctocort was sent.  Patient also requests a refill of the oxycodone which has been sent to [facility]'s pharmacy on [address].  Patient also having ankle swelling possible related to the gemcitabine.  Overall his appetite is increased and his weight has been increasing.  He will return to the office in 2 weeks or sooner as needed. [Palliative] : Goals of care discussed with patient: Palliative [Palliative Care Plan] : not applicable at this time

## 2025-04-15 NOTE — HISTORY OF PRESENT ILLNESS
[Disease: _____________________] : Disease: [unfilled] [de-identified] : This is a 51 yo male with hx of pancreatic head mass which on biopsy is consistent with adenocarcinoma who presents for evaluation and management. 06/02/2024, CT-Scan of Abdomen/Pelvis w IV Contrast: 2 cm circumscribed hypodense mass within the head of the pancreas suspicious for pancreatic carcinoma resulting in moderate dilatation of the main pancreatic duct, biliary system as well as the gallbladder. Mild peripancreatic lymphadenopathy possibly metastatic in nature. Finding suspicious for mild segmental colitis involving descending and  sigmoid colon.   06/02/2024, CT-Scan of Chest: A 10 X 8 X 6 mm mildly spiculated nodule in the right lower lobe which is suspicious. Further evaluation with PET/CT is recommended. Alternatively, short-term follow-up with chest CT in 3 months may be considered. No other metastatic lesion in the chest. Partially visualized pancreatic head mass with dilatation of the main pancreatic duct.   06/04/2024, ERCP: The major papilla was native. The major papilla had a normal appearance. The common bile duct was deeply cannulated using a traction sphincterotome with guidewire. No bleeding was observed. Localized, extrinsic and severe stricture was visualized in the distal common bile duct. The stricture was traversable by wire. Performed brushing with cytology brush in the distal common bile duct. One 10 mm x 4mm fully covered metal stent was placed in the distal common bile duct. The pathology report from that was as follows: Distal common bile duct brushing for cytology: Positive for Adenocarcinoma.   06/10/2024, MRI of Abdomen: In the head of pancreas, there is a 2.5 X 2.1 cm heterogenous mass suggestive of malignancy. Pancreatic duct is dilated up to 10 mm. Status post CBD stent placement. There is significant improvemnt in the intrahepatic biliary dilatation. Right lower lobe  lung nodules is incompletely imaged.  [de-identified] : The patient continues on gem Abraxane and will be monitored for side effects toxicity and benefit.

## 2025-04-29 NOTE — REVIEW OF SYSTEMS
[Fatigue] : fatigue [Abdominal Pain] : abdominal pain [Diarrhea: Grade 0] : Diarrhea: Grade 0 [Negative] : Allergic/Immunologic

## 2025-05-14 NOTE — PHYSICAL EXAM
[Ambulatory and capable of all self care but unable to carry out any work activities] : Status 2- Ambulatory and capable of all self care but unable to carry out any work activities. Up and about more than 50% of waking hours [Thin] : thin [Mucositis] : mucositis [Thrush] : no thrush [Vesicles] : vesicles [Normal] : affect appropriate [de-identified] : +herpetic lesion to scrotum/penis head [de-identified] : + swelling to bilateral feet, +purple discoloration, unable to palpate pulses, but skin is warm. [de-identified] : +papular lesion to dorsal side and between 2 finger of right hand

## 2025-05-14 NOTE — REVIEW OF SYSTEMS
[Fatigue] : fatigue [Abdominal Pain] : abdominal pain [Diarrhea: Grade 0] : Diarrhea: Grade 0 [Negative] : Allergic/Immunologic [FreeTextEntry6] : occ SOB [FreeTextEntry7] : has rectal pain and diarrhea [de-identified] : has numbness, tingling

## 2025-05-14 NOTE — HISTORY OF PRESENT ILLNESS
[Disease: _____________________] : Disease: [unfilled] [de-identified] : This is a 53 yo male with hx of pancreatic head mass which on biopsy is consistent with adenocarcinoma who presents for evaluation and management. 06/02/2024, CT-Scan of Abdomen/Pelvis w IV Contrast: 2 cm circumscribed hypodense mass within the head of the pancreas suspicious for pancreatic carcinoma resulting in moderate dilatation of the main pancreatic duct, biliary system as well as the gallbladder. Mild peripancreatic lymphadenopathy possibly metastatic in nature. Finding suspicious for mild segmental colitis involving descending and  sigmoid colon.   06/02/2024, CT-Scan of Chest: A 10 X 8 X 6 mm mildly spiculated nodule in the right lower lobe which is suspicious. Further evaluation with PET/CT is recommended. Alternatively, short-term follow-up with chest CT in 3 months may be considered. No other metastatic lesion in the chest. Partially visualized pancreatic head mass with dilatation of the main pancreatic duct.   06/04/2024, ERCP: The major papilla was native. The major papilla had a normal appearance. The common bile duct was deeply cannulated using a traction sphincterotome with guidewire. No bleeding was observed. Localized, extrinsic and severe stricture was visualized in the distal common bile duct. The stricture was traversable by wire. Performed brushing with cytology brush in the distal common bile duct. One 10 mm x 4mm fully covered metal stent was placed in the distal common bile duct. The pathology report from that was as follows: Distal common bile duct brushing for cytology: Positive for Adenocarcinoma.   06/10/2024, MRI of Abdomen: In the head of pancreas, there is a 2.5 X 2.1 cm heterogenous mass suggestive of malignancy. Pancreatic duct is dilated up to 10 mm. Status post CBD stent placement. There is significant improvemnt in the intrahepatic biliary dilatation. Right lower lobe  lung nodules is incompletely imaged.  [de-identified] : S/p C5D8 Gemzar/Abraxane. He has completed the antiviral medication to treat Herpes outbreak. Presently, he feels better and the lesions to hands, mouth and scrotal area are clear-up.  He denies any fever, chills, diarrhea or constipation. On 04/29/2025, a repeat CT-Scan of abdomen/pelvis/chest which shows: tatus post Whipple procedure and right hemicolectomy. 2.8 x 2.4 cm enlarging lesion at the surgical margin, compatible with recurrent or residual tumor with associated multivessel arteriovenous encasement and abutment as described above. Increasing nodular densities predominantly involving right lung. Neoplastic process is difficult to exclude. Interval development of moderate ascites. Omental nodularity/congestion may be secondary to ascites and vascular involvement. Multiple small bowel loops demonstrate mild circumferential wall thickening. This may be secondary to ascites and vascular involvement of the SMV. He has progression of disease, and Dr. Mar will discuss with him the next steps in his care. The present treatment will be discontinued.   Symmetric pattern of renal enhancement with multifocal subtle areas of diminished cortical attenuation may be secondary to vascular involvement              The patient has lesions to hands, feet mouth and scrotum. The are decreased strength to hands, + numbness, and tingling to feet/toes.

## 2025-05-14 NOTE — ASSESSMENT
[FreeTextEntry1] : We will discontinue Abraxane/Gemzar.  04/29/2025 of CT-Scan of abdomen/pelvis/chest shows: tatus post Whipple procedure and right hemicolectomy. 2.8 x 2.4 cm enlarging lesion at the surgical margin, compatible with recurrent or residual tumor with associated multivessel arteriovenous encasement and abutment as described above. Increasing nodular densities predominantly involving right lung. Neoplastic process is difficult to exclude. Interval development of moderate ascites. Omental nodularity/congestion may be secondary to ascites and vascular involvement. Multiple small bowel loops demonstrate mild circumferential wall thickening. This may be secondary to ascites and vascular involvement of the SMV. Symmetric pattern of renal enhancement with multifocal subtle areas of diminished cortical attenuation may be secondary to vascular involvement. There is progression of disease. Dr. Mar has discussed the next line of chemotherapy with the patient of 5FU/Leucovorin/Onivyde. He has signed an informed consent, and he will start treatment next week. I reviewed the venous duplex and arterial which were both negative for occlusions. We will give IV fluid today, The patient will RTO in 1 week.  Patient being seen as per physician's primary plan of care.  [Palliative] : Goals of care discussed with patient: Palliative [Palliative Care Plan] : not applicable at this time

## 2025-05-22 NOTE — PHYSICAL EXAM
[Ambulatory and capable of all self care but unable to carry out any work activities] : Status 2- Ambulatory and capable of all self care but unable to carry out any work activities. Up and about more than 50% of waking hours [Thin] : thin [Mucositis] : mucositis [Vesicles] : vesicles [Normal] : affect appropriate [Thrush] : no thrush [de-identified] : +herpetic lesion to scrotum/penis head [de-identified] : + swelling to bilateral feet, +purple discoloration, unable to palpate pulses, but skin is warm. [de-identified] : +papular lesion to dorsal side and between 2 finger of right hand

## 2025-05-22 NOTE — RESULTS/DATA
[FreeTextEntry1] :  On 05/06/2025, Scan of abdomen/pelvis/chest has shown: Status post Whipple procedure and right hemicolectomy. 2.8 x 2.4 cm enlarging lesion at the surgical margin, compatible with recurrent or residual tumor with associated multivessel arteriovenous encasement and abutment as described above. Increasing nodular densities predominantly involving right lung. Neoplastic process is difficult to exclude. Interval development of moderate ascites. Omental nodularity/congestion may be secondary to ascites and vascular involvement. Multiple small bowel loops demonstrate mild circumferential wall thickening. This may be secondary to ascites and vascular involvement of the SMV. He has progression of disease, and Dr. Mar will discuss with him the next steps in his care. The present treatment will be discontinued. Symmetric pattern of renal enhancement with multifocal subtle areas of diminished cortical attenuation may be secondary to vascular involvement

## 2025-05-22 NOTE — HISTORY OF PRESENT ILLNESS
[Disease: _____________________] : Disease: [unfilled] [de-identified] : This is a 53 yo male with hx of pancreatic head mass which on biopsy is consistent with adenocarcinoma who presents for evaluation and management. 06/02/2024, CT-Scan of Abdomen/Pelvis w IV Contrast: 2 cm circumscribed hypodense mass within the head of the pancreas suspicious for pancreatic carcinoma resulting in moderate dilatation of the main pancreatic duct, biliary system as well as the gallbladder. Mild peripancreatic lymphadenopathy possibly metastatic in nature. Finding suspicious for mild segmental colitis involving descending and  sigmoid colon.   06/02/2024, CT-Scan of Chest: A 10 X 8 X 6 mm mildly spiculated nodule in the right lower lobe which is suspicious. Further evaluation with PET/CT is recommended. Alternatively, short-term follow-up with chest CT in 3 months may be considered. No other metastatic lesion in the chest. Partially visualized pancreatic head mass with dilatation of the main pancreatic duct.   06/04/2024, ERCP: The major papilla was native. The major papilla had a normal appearance. The common bile duct was deeply cannulated using a traction sphincterotome with guidewire. No bleeding was observed. Localized, extrinsic and severe stricture was visualized in the distal common bile duct. The stricture was traversable by wire. Performed brushing with cytology brush in the distal common bile duct. One 10 mm x 4mm fully covered metal stent was placed in the distal common bile duct. The pathology report from that was as follows: Distal common bile duct brushing for cytology: Positive for Adenocarcinoma.   06/10/2024, MRI of Abdomen: In the head of pancreas, there is a 2.5 X 2.1 cm heterogenous mass suggestive of malignancy. Pancreatic duct is dilated up to 10 mm. Status post CBD stent placement. There is significant improvemnt in the intrahepatic biliary dilatation. Right lower lobe  lung nodules is incompletely imaged.  [de-identified] : C9 FOLFIRINOX completed 11/04/2024 C5D8 Gemzar/Abraxane complete 04/22/2025 [de-identified] : The patient is here for C1 5FU/Leucovorin/Onyvide. The last CT-Scan of abdomen/pelvis/chest done on 05/09/2025, has shown progression of disease.  Presently, he feels better and the lesions to hands, mouth and scrotal area are clear-up.  He denies any fever, chills, diarrhea or constipation.

## 2025-05-22 NOTE — PHYSICAL EXAM
[Ambulatory and capable of all self care but unable to carry out any work activities] : Status 2- Ambulatory and capable of all self care but unable to carry out any work activities. Up and about more than 50% of waking hours [Thin] : thin [Mucositis] : mucositis [Vesicles] : vesicles [Normal] : affect appropriate [Thrush] : no thrush [de-identified] : +herpetic lesion to scrotum/penis head [de-identified] : + swelling to bilateral feet, +purple discoloration, unable to palpate pulses, but skin is warm. [de-identified] : +papular lesion to dorsal side and between 2 finger of right hand

## 2025-05-22 NOTE — HISTORY OF PRESENT ILLNESS
[Disease: _____________________] : Disease: [unfilled] [de-identified] : This is a 53 yo male with hx of pancreatic head mass which on biopsy is consistent with adenocarcinoma who presents for evaluation and management. 06/02/2024, CT-Scan of Abdomen/Pelvis w IV Contrast: 2 cm circumscribed hypodense mass within the head of the pancreas suspicious for pancreatic carcinoma resulting in moderate dilatation of the main pancreatic duct, biliary system as well as the gallbladder. Mild peripancreatic lymphadenopathy possibly metastatic in nature. Finding suspicious for mild segmental colitis involving descending and  sigmoid colon.   06/02/2024, CT-Scan of Chest: A 10 X 8 X 6 mm mildly spiculated nodule in the right lower lobe which is suspicious. Further evaluation with PET/CT is recommended. Alternatively, short-term follow-up with chest CT in 3 months may be considered. No other metastatic lesion in the chest. Partially visualized pancreatic head mass with dilatation of the main pancreatic duct.   06/04/2024, ERCP: The major papilla was native. The major papilla had a normal appearance. The common bile duct was deeply cannulated using a traction sphincterotome with guidewire. No bleeding was observed. Localized, extrinsic and severe stricture was visualized in the distal common bile duct. The stricture was traversable by wire. Performed brushing with cytology brush in the distal common bile duct. One 10 mm x 4mm fully covered metal stent was placed in the distal common bile duct. The pathology report from that was as follows: Distal common bile duct brushing for cytology: Positive for Adenocarcinoma.   06/10/2024, MRI of Abdomen: In the head of pancreas, there is a 2.5 X 2.1 cm heterogenous mass suggestive of malignancy. Pancreatic duct is dilated up to 10 mm. Status post CBD stent placement. There is significant improvemnt in the intrahepatic biliary dilatation. Right lower lobe  lung nodules is incompletely imaged.  [de-identified] : C9 FOLFIRINOX completed 11/04/2024 C5D8 Gemzar/Abraxane complete 04/22/2025 [de-identified] : The patient is here for C1 5FU/Leucovorin/Onyvide. The last CT-Scan of abdomen/pelvis/chest done on 05/09/2025, has shown progression of disease.  Presently, he feels better and the lesions to hands, mouth and scrotal area are clear-up.  He denies any fever, chills, diarrhea or constipation.

## 2025-05-22 NOTE — REVIEW OF SYSTEMS
[Fatigue] : fatigue [Abdominal Pain] : abdominal pain [Diarrhea: Grade 0] : Diarrhea: Grade 0 [Negative] : Allergic/Immunologic [FreeTextEntry6] : occ SOB [FreeTextEntry7] : has rectal pain and diarrhea [de-identified] : has numbness, tingling

## 2025-05-22 NOTE — REASON FOR VISIT
[Follow-Up Visit] : a follow-up [Parent] : parent [FreeTextEntry2] : ca pancreas cell phone/clothing

## 2025-05-22 NOTE — ASSESSMENT
[Palliative] : Goals of care discussed with patient: Palliative [Palliative Care Plan] : not applicable at this time [FreeTextEntry1] : Abraxane/Gemzar has been discontinued. He is stable to start next line of chemotherapy which is 5FU/Leucovorin/Onyvide cycle1 today.  After 05/06/2025, CT-Scan of abdomen/pelvis/chest shows:Status post Whipple procedure and right hemicolectomy. 2.8 x 2.4 cm enlarging lesion at the surgical margin, compatible with recurrent or residual tumor with associated multivessel arteriovenous encasement and abutment as described above. Increasing nodular densities predominantly involving right lung. Neoplastic process is difficult to exclude. Interval development of moderate ascites. Omental nodularity/congestion may be secondary to ascites and vascular involvement. Multiple small bowel loops demonstrate mild circumferential wall thickening. This may be secondary to ascites and vascular involvement of the SMV. Symmetric pattern of renal enhancement with multifocal subtle areas of diminished cortical attenuation may be secondary to vascular involvement.  We will continue to monitor the patient for side effects and toxicities. Today, I ordered repeat blood tests with tumor markers, f/u. The patient will RTO in 2 weeks.   Patient being seen as per physician's primary plan of care.

## 2025-05-22 NOTE — REVIEW OF SYSTEMS
[Fatigue] : fatigue [Abdominal Pain] : abdominal pain [Diarrhea: Grade 0] : Diarrhea: Grade 0 [Negative] : Allergic/Immunologic [FreeTextEntry6] : occ SOB [FreeTextEntry7] : has rectal pain and diarrhea [de-identified] : has numbness, tingling

## 2025-06-03 NOTE — REVIEW OF SYSTEMS
[Fatigue] : fatigue [Diarrhea: Grade 0] : Diarrhea: Grade 0 [Negative] : Allergic/Immunologic [FreeTextEntry7] : Has persistent diarrhea. [de-identified] : Has numbness and tingling in the hands and feet.

## 2025-06-03 NOTE — HISTORY OF PRESENT ILLNESS
[Disease: _____________________] : Disease: [unfilled] [de-identified] : This is a 53 yo male with hx of pancreatic head mass which on biopsy is consistent with adenocarcinoma who presents for evaluation and management. 06/02/2024, CT-Scan of Abdomen/Pelvis w IV Contrast: 2 cm circumscribed hypodense mass within the head of the pancreas suspicious for pancreatic carcinoma resulting in moderate dilatation of the main pancreatic duct, biliary system as well as the gallbladder. Mild peripancreatic lymphadenopathy possibly metastatic in nature. Finding suspicious for mild segmental colitis involving descending and  sigmoid colon.   06/02/2024, CT-Scan of Chest: A 10 X 8 X 6 mm mildly spiculated nodule in the right lower lobe which is suspicious. Further evaluation with PET/CT is recommended. Alternatively, short-term follow-up with chest CT in 3 months may be considered. No other metastatic lesion in the chest. Partially visualized pancreatic head mass with dilatation of the main pancreatic duct.   06/04/2024, ERCP: The major papilla was native. The major papilla had a normal appearance. The common bile duct was deeply cannulated using a traction sphincterotome with guidewire. No bleeding was observed. Localized, extrinsic and severe stricture was visualized in the distal common bile duct. The stricture was traversable by wire. Performed brushing with cytology brush in the distal common bile duct. One 10 mm x 4mm fully covered metal stent was placed in the distal common bile duct. The pathology report from that was as follows: Distal common bile duct brushing for cytology: Positive for Adenocarcinoma.   06/10/2024, MRI of Abdomen: In the head of pancreas, there is a 2.5 X 2.1 cm heterogenous mass suggestive of malignancy. Pancreatic duct is dilated up to 10 mm. Status post CBD stent placement. There is significant improvemnt in the intrahepatic biliary dilatation. Right lower lobe  lung nodules is incompletely imaged.  [de-identified] : The patient continues on chemotherapy with Onivyde 5-FU leucovorin and overall tolerates treatment well but has continued diarrhea.  He is able to maintain his weight however.

## 2025-06-03 NOTE — ASSESSMENT
[FreeTextEntry1] : The patient will continue on treatment with Onivyde 5-FU leucovorin and complete 4 cycles of therapy.  He will then undergo follow-up scans to assess for response or progression of disease.  He has been noticing increased weakness in his legs and we discussed going for physical therapy.  [place] to get the physical therapy.  Once we have more information further recommendations will be made.  He will return for treatment in 2 weeks. [Palliative] : Goals of care discussed with patient: Palliative [Palliative Care Plan] : not applicable at this time

## 2025-06-18 NOTE — HISTORY OF PRESENT ILLNESS
[de-identified] : This is a 51 yo male with hx of pancreatic head mass which on biopsy is consistent with adenocarcinoma who presents for evaluation and management. 06/02/2024, CT-Scan of Abdomen/Pelvis w IV Contrast: 2 cm circumscribed hypodense mass within the head of the pancreas suspicious for pancreatic carcinoma resulting in moderate dilatation of the main pancreatic duct, biliary system as well as the gallbladder. Mild peripancreatic lymphadenopathy possibly metastatic in nature. Finding suspicious for mild segmental colitis involving descending and  sigmoid colon.   06/02/2024, CT-Scan of Chest: A 10 X 8 X 6 mm mildly spiculated nodule in the right lower lobe which is suspicious. Further evaluation with PET/CT is recommended. Alternatively, short-term follow-up with chest CT in 3 months may be considered. No other metastatic lesion in the chest. Partially visualized pancreatic head mass with dilatation of the main pancreatic duct.   06/04/2024, ERCP: The major papilla was native. The major papilla had a normal appearance. The common bile duct was deeply cannulated using a traction sphincterotome with guidewire. No bleeding was observed. Localized, extrinsic and severe stricture was visualized in the distal common bile duct. The stricture was traversable by wire. Performed brushing with cytology brush in the distal common bile duct. One 10 mm x 4mm fully covered metal stent was placed in the distal common bile duct. The pathology report from that was as follows: Distal common bile duct brushing for cytology: Positive for Adenocarcinoma.   06/10/2024, MRI of Abdomen: In the head of pancreas, there is a 2.5 X 2.1 cm heterogenous mass suggestive of malignancy. Pancreatic duct is dilated up to 10 mm. Status post CBD stent placement. There is significant improvemnt in the intrahepatic biliary dilatation. Right lower lobe  lung nodules is incompletely imaged.  [de-identified] : The patient receives Onivyde 5-FU Leucovorin every 2 weeks. Today, he c/o increased frequency, urgency and dysuria with trace of hematuria. He denies fever or chills. His appetite is okay and he has no mouth sores.

## 2025-06-18 NOTE — HISTORY OF PRESENT ILLNESS
[de-identified] : This is a 53 yo male with hx of pancreatic head mass which on biopsy is consistent with adenocarcinoma who presents for evaluation and management. 06/02/2024, CT-Scan of Abdomen/Pelvis w IV Contrast: 2 cm circumscribed hypodense mass within the head of the pancreas suspicious for pancreatic carcinoma resulting in moderate dilatation of the main pancreatic duct, biliary system as well as the gallbladder. Mild peripancreatic lymphadenopathy possibly metastatic in nature. Finding suspicious for mild segmental colitis involving descending and  sigmoid colon.   06/02/2024, CT-Scan of Chest: A 10 X 8 X 6 mm mildly spiculated nodule in the right lower lobe which is suspicious. Further evaluation with PET/CT is recommended. Alternatively, short-term follow-up with chest CT in 3 months may be considered. No other metastatic lesion in the chest. Partially visualized pancreatic head mass with dilatation of the main pancreatic duct.   06/04/2024, ERCP: The major papilla was native. The major papilla had a normal appearance. The common bile duct was deeply cannulated using a traction sphincterotome with guidewire. No bleeding was observed. Localized, extrinsic and severe stricture was visualized in the distal common bile duct. The stricture was traversable by wire. Performed brushing with cytology brush in the distal common bile duct. One 10 mm x 4mm fully covered metal stent was placed in the distal common bile duct. The pathology report from that was as follows: Distal common bile duct brushing for cytology: Positive for Adenocarcinoma.   06/10/2024, MRI of Abdomen: In the head of pancreas, there is a 2.5 X 2.1 cm heterogenous mass suggestive of malignancy. Pancreatic duct is dilated up to 10 mm. Status post CBD stent placement. There is significant improvemnt in the intrahepatic biliary dilatation. Right lower lobe  lung nodules is incompletely imaged.  [de-identified] : The patient receives Onivyde 5-FU Leucovorin every 2 weeks. Today, he c/o increased frequency, urgency and dysuria with trace of hematuria. He denies fever or chills. His appetite is okay and he has no mouth sores.

## 2025-06-18 NOTE — REVIEW OF SYSTEMS
[FreeTextEntry7] : Has persistent diarrhea. [de-identified] : Has numbness and tingling in the hands and feet.

## 2025-06-18 NOTE — REVIEW OF SYSTEMS
[FreeTextEntry7] : Has persistent diarrhea. [de-identified] : Has numbness and tingling in the hands and feet.

## 2025-06-18 NOTE — ASSESSMENT
[FreeTextEntry1] : The patient will hold treatment today due symptoms and UTI. I sent labs UA/ Urine culture, f/u.  He was given RX Cipro 500 mg bid x 7 days to start. He will return to office in 2 weeks to resume treatment with Onivyde 5-FU leucovorin and complete 4 cycles of therapy, then he will undergo repeat scans to assess for response or progression of disease. Today, I ordered repeat blood tests with tumor markers, f/u. He will return for treatment in 2 weeks.   Patient being seen as per physician's primary plan of care.

## 2025-07-01 NOTE — ASSESSMENT
[FreeTextEntry1] : A discussion took place with the patient and his father regarding further management.  The patient states that this time and he does not feel the current chemotherapy is helping him and he wishes to stop treatment and go on hospice therapy.  He is complaining of increasing abdominal discomfort and scrotal swelling and as a result we will arrange for an urgent paracentesis as an outpatient.  The patient wishes to return to his hometown and [place] with hospice services and not receive more chemotherapy.  We will be making arrangements with the local hospice program forwarding records and information.  The plan is to keep the patient comfortable and offer palliative care.  I [Palliative] : Goals of care discussed with patient: Palliative [Palliative Care Plan] : Patient was apprised of incurable nature of disease.  Hospice and Palliative care options discussed.

## 2025-07-01 NOTE — REVIEW OF SYSTEMS
[Fatigue] : fatigue [SOB on Exertion] : shortness of breath during exertion [Diarrhea: Grade 0] : Diarrhea: Grade 0 [Negative] : Allergic/Immunologic [FreeTextEntry7] : Patient has abdominal bloating from ascites and swollen testicle.

## 2025-07-01 NOTE — HISTORY OF PRESENT ILLNESS
[Disease: _____________________] : Disease: [unfilled] [de-identified] : This is a 51 yo male with hx of pancreatic head mass which on biopsy is consistent with adenocarcinoma who presents for evaluation and management. 06/02/2024, CT-Scan of Abdomen/Pelvis w IV Contrast: 2 cm circumscribed hypodense mass within the head of the pancreas suspicious for pancreatic carcinoma resulting in moderate dilatation of the main pancreatic duct, biliary system as well as the gallbladder. Mild peripancreatic lymphadenopathy possibly metastatic in nature. Finding suspicious for mild segmental colitis involving descending and  sigmoid colon.   06/02/2024, CT-Scan of Chest: A 10 X 8 X 6 mm mildly spiculated nodule in the right lower lobe which is suspicious. Further evaluation with PET/CT is recommended. Alternatively, short-term follow-up with chest CT in 3 months may be considered. No other metastatic lesion in the chest. Partially visualized pancreatic head mass with dilatation of the main pancreatic duct.   06/04/2024, ERCP: The major papilla was native. The major papilla had a normal appearance. The common bile duct was deeply cannulated using a traction sphincterotome with guidewire. No bleeding was observed. Localized, extrinsic and severe stricture was visualized in the distal common bile duct. The stricture was traversable by wire. Performed brushing with cytology brush in the distal common bile duct. One 10 mm x 4mm fully covered metal stent was placed in the distal common bile duct. The pathology report from that was as follows: Distal common bile duct brushing for cytology: Positive for Adenocarcinoma.   06/10/2024, MRI of Abdomen: In the head of pancreas, there is a 2.5 X 2.1 cm heterogenous mass suggestive of malignancy. Pancreatic duct is dilated up to 10 mm. Status post CBD stent placement. There is significant improvemnt in the intrahepatic biliary dilatation. Right lower lobe  lung nodules is incompletely imaged.  [de-identified] : Since the last visit the patient continues on chemotherapy with salvage treatment including Onivyde 5-FU leucovorin.  Patient returns to discuss further management.

## 2025-07-08 NOTE — PHYSICAL EXAM
[Alert] : alert [No Acute Distress] : no acute distress [Well Nourished] : well nourished [Well Developed] : well developed [Healthy Appearance] : healthy appearance [Normal Voice/Communication] : normal voice communication [Ambulatory and capable of all selfcare but unable to carry out any work activities] : Ambulatory and capable of all selfcare but unable to carry out any work activities. Up and about more than 50% of waking hours

## 2025-07-09 NOTE — ASSESSMENT
[Other: _____] : [unfilled] [FreeTextEntry1] : This is a 52 y/o M with hx of DMT2, bx proven pancreatic adenocarcinoma diagnosed in 2024 s/p Whipple surgery, now with metastasis with malignant ascites requiring LVP x 1 (on 7/3 - 11 L) who has stopped chemotherapy & is now a hospice patient, who presents to IR today for peritoneal pleurx catheter insertion consultation.  # Recurrent Ascites - pt with hx of pancreatic CA s/p Whipple in 2024 now with METS - pt elected to stop chemotherapy & is now transitioning to hospice care & will be transferring hospice care to [place] - presents for peritoneal pleurx catheter insertion - referred by Dr Mar - reviewed procedural benefits, alternatives, risks (infection, bleeding, etc) & expected post procedure course - reviewed ascites must be present to safely place the catheter - will perform an u/s dos first to assess this - oncology / hospice to make arrangements for home care - instructed to hold Advil 5 days to aid in mitigating bleeding risk - IR post procedure f/u: none  # T2DM  - maintained on Novolog sliding scale & Lantus 8 units ea AM - states has not utilized meds for 1 month  - reports weekly FBS is typically 80 - 110 - instructed to hold morning dose of Lantus pre op dos - FBS pre op dos     Mr. TREJO's comprehension was confirmed & all questions were asked & answered to his satisfaction. The patient has expressed that he would like to move forward with the procedure.   IR contact information was provided to the pt should there be any additional questions or concerns that may be addressed & the pt was informed that the booking office will call to schedule procedure.   as above, discussed with patient risks/benefits of procedure. patient coming from [facility], however given only single paracentesis 10L last week, unclear rate of accumulation. will plan to scan at bedside prior to sedation day of rather than order ultrasound to expedite patient's care. patient counseled risk of insufficient fluid for procedure on day of and wishes to proceed with plan

## 2025-07-09 NOTE — ASSESSMENT
[Other: _____] : [unfilled] [FreeTextEntry1] : This is a 52 y/o M with hx of DMT2, bx proven pancreatic adenocarcinoma diagnosed in 2024 s/p Whipple surgery, now with metastasis with malignant ascites requiring LVP x 1 (on 7/3 - 11 L) who has stopped chemotherapy & is now a hospice patient, who presents to IR today for peritoneal pleurx catheter insertion consultation.  # Recurrent Ascites - pt with hx of pancreatic CA s/p Whipple in 2024 now with METS - pt elected to stop chemotherapy & is now transitioning to hospice care & will be transferring hospice care to [place] - presents for peritoneal pleurx catheter insertion - referred by Dr Mar - reviewed procedural benefits, alternatives, risks (infection, bleeding, etc) & expected post procedure course - reviewed ascites must be present to safely place the catheter - will perform an u/s dos first to assess this - oncology / hospice to make arrangements for home care - instructed to hold Advil 5 days to aid in mitigating bleeding risk - IR post procedure f/u: none  # T2DM  - maintained on Novolog sliding scale & Lantus 8 units ea AM - states has not utilized meds for 1 month  - reports weekly FBS is typically 80 - 110 - instructed to hold morning dose of Lantus pre op dos - FBS pre op dos     Mr. TREJO's comprehension was confirmed & all questions were asked & answered to his satisfaction. The patient has expressed that he would like to move forward with the procedure.   IR contact information was provided to the pt should there be any additional questions or concerns that may be addressed & the pt was informed that the booking office will call to schedule procedure.   as above, discussed with patient risks/benefits of procedure. patient coming from [facility], however given only single paracentesis 10L last week, unclear rate of accumulation. will plan to scan at bedside prior to sedation day of rather than order ultrasound to expedite patient's care. patient counseled risk of insufficient fluid for procedure on day of and wishes to proceed with plan

## 2025-07-09 NOTE — HISTORY OF PRESENT ILLNESS
[0] : ~His/Her~ pain was 0 out of 10 [Home] : at home, [unfilled] , at the time of the visit. [Medical Office: ([facility])___] : at the medical office located in  [Telehealth (audio & video)] : This visit was provided via telehealth using real-time 2-way audio visual technology. [Verbal consent obtained from patient] : the patient, [unfilled] [FreeTextEntry1] : This is a 52 y/o M with hx of DMT2, bx proven pancreatic adenocarcinoma diagnosed in 2024 s/p Whipple surgery, now with metastasis with malignant ascites requiring LVP x 1 (on 7/2 - 11.2 L) who has stopped chemotherapy & is now a hospice patient, who presents to IR today for peritoneal pleurx catheter insertion consultation, as referred by Dr Mar.  Mr Goel states that he will be going back to [place], his hometow & hospice care will be transferred there (oncology is facilitating this process).  Reports Advil usage.  Mr. GOEL has no objections to receiving blood or blood products.   HemOnc Dr Mar